# Patient Record
Sex: FEMALE | Race: WHITE | Employment: FULL TIME | URBAN - METROPOLITAN AREA
[De-identification: names, ages, dates, MRNs, and addresses within clinical notes are randomized per-mention and may not be internally consistent; named-entity substitution may affect disease eponyms.]

---
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
## 2020-01-08 ENCOUNTER — HOSPITAL ENCOUNTER (OUTPATIENT)
Age: 20
Discharge: HOME | DRG: 560 | End: 2020-01-08
Attending: OBSTETRICS & GYNECOLOGY | Admitting: OBSTETRICS & GYNECOLOGY
Payer: MEDICAID

## 2020-01-08 VITALS
BODY MASS INDEX: 24.45 KG/M2 | HEART RATE: 94 BPM | SYSTOLIC BLOOD PRESSURE: 114 MMHG | RESPIRATION RATE: 16 BRPM | DIASTOLIC BLOOD PRESSURE: 57 MMHG | TEMPERATURE: 97.4 F | OXYGEN SATURATION: 95 % | WEIGHT: 138 LBS | HEIGHT: 63 IN

## 2020-01-08 PROBLEM — Z34.90 PREGNANCY: Status: ACTIVE | Noted: 2020-01-08

## 2020-01-08 PROCEDURE — 99283 EMERGENCY DEPT VISIT LOW MDM: CPT

## 2020-01-08 PROCEDURE — 59020 FETAL CONTRACT STRESS TEST: CPT

## 2020-01-08 RX ORDER — LANOLIN ALCOHOL/MO/W.PET/CERES
CREAM (GRAM) TOPICAL
COMMUNITY
End: 2022-03-22 | Stop reason: ALTCHOICE

## 2020-01-09 ENCOUNTER — HOSPITAL ENCOUNTER (INPATIENT)
Age: 20
LOS: 3 days | Discharge: HOME | DRG: 560 | End: 2020-01-12
Attending: OBSTETRICS & GYNECOLOGY | Admitting: OBSTETRICS & GYNECOLOGY
Payer: MEDICAID

## 2020-01-09 LAB
BASOPHILS # BLD: 0.1 K/UL (ref 0–0.1)
BASOPHILS NFR BLD: 0 % (ref 0–1)
DIFFERENTIAL METHOD BLD: ABNORMAL
EOSINOPHIL # BLD: 0.1 K/UL (ref 0–0.4)
EOSINOPHIL NFR BLD: 1 % (ref 0–7)
ERYTHROCYTE [DISTWIDTH] IN BLOOD BY AUTOMATED COUNT: 15.9 % (ref 11.5–14.5)
HCT VFR BLD AUTO: 37.7 % (ref 35–47)
HGB BLD-MCNC: 12.1 G/DL (ref 11.5–16)
IMM GRANULOCYTES # BLD AUTO: 0.2 K/UL (ref 0–0.04)
IMM GRANULOCYTES NFR BLD AUTO: 2 % (ref 0–0.5)
LYMPHOCYTES # BLD: 1.6 K/UL (ref 0.8–3.5)
LYMPHOCYTES NFR BLD: 11 % (ref 12–49)
MCH RBC QN AUTO: 25.9 PG (ref 26–34)
MCHC RBC AUTO-ENTMCNC: 32.1 G/DL (ref 30–36.5)
MCV RBC AUTO: 80.6 FL (ref 80–99)
MONOCYTES # BLD: 0.8 K/UL (ref 0–1)
MONOCYTES NFR BLD: 5 % (ref 5–13)
NEUTS SEG # BLD: 11.8 K/UL (ref 1.8–8)
NEUTS SEG NFR BLD: 81 % (ref 32–75)
NRBC # BLD: 0 K/UL (ref 0–0.01)
NRBC BLD-RTO: 0 PER 100 WBC
PLATELET # BLD AUTO: 317 K/UL (ref 150–400)
PMV BLD AUTO: 11.7 FL (ref 8.9–12.9)
RBC # BLD AUTO: 4.68 M/UL (ref 3.8–5.2)
WBC # BLD AUTO: 14.5 K/UL (ref 3.6–11)

## 2020-01-09 PROCEDURE — 85025 COMPLETE CBC W/AUTO DIFF WBC: CPT

## 2020-01-09 PROCEDURE — 65410000002 HC RM PRIVATE OB

## 2020-01-09 PROCEDURE — 75410000002 HC LABOR FEE PER 1 HR

## 2020-01-09 PROCEDURE — 36415 COLL VENOUS BLD VENIPUNCTURE: CPT

## 2020-01-09 PROCEDURE — 74011250637 HC RX REV CODE- 250/637: Performed by: OBSTETRICS & GYNECOLOGY

## 2020-01-09 RX ORDER — SODIUM CHLORIDE 0.9 % (FLUSH) 0.9 %
5-40 SYRINGE (ML) INJECTION EVERY 8 HOURS
Status: DISCONTINUED | OUTPATIENT
Start: 2020-01-09 | End: 2020-01-12 | Stop reason: HOSPADM

## 2020-01-09 RX ORDER — OXYTOCIN/0.9 % SODIUM CHLORIDE 30/500 ML
0-25 PLASTIC BAG, INJECTION (ML) INTRAVENOUS
Status: DISCONTINUED | OUTPATIENT
Start: 2020-01-10 | End: 2020-01-12 | Stop reason: HOSPADM

## 2020-01-09 RX ORDER — SODIUM CHLORIDE, SODIUM LACTATE, POTASSIUM CHLORIDE, CALCIUM CHLORIDE 600; 310; 30; 20 MG/100ML; MG/100ML; MG/100ML; MG/100ML
125 INJECTION, SOLUTION INTRAVENOUS CONTINUOUS
Status: DISCONTINUED | OUTPATIENT
Start: 2020-01-09 | End: 2020-01-12 | Stop reason: HOSPADM

## 2020-01-09 RX ORDER — NALOXONE HYDROCHLORIDE 0.4 MG/ML
0.4 INJECTION, SOLUTION INTRAMUSCULAR; INTRAVENOUS; SUBCUTANEOUS AS NEEDED
Status: DISCONTINUED | OUTPATIENT
Start: 2020-01-09 | End: 2020-01-12 | Stop reason: HOSPADM

## 2020-01-09 RX ORDER — SODIUM CHLORIDE 0.9 % (FLUSH) 0.9 %
5-40 SYRINGE (ML) INJECTION AS NEEDED
Status: DISCONTINUED | OUTPATIENT
Start: 2020-01-09 | End: 2020-01-12 | Stop reason: HOSPADM

## 2020-01-09 RX ORDER — MISOPROSTOL 100 UG/1
25 TABLET ORAL EVERY 4 HOURS
Status: DISCONTINUED | OUTPATIENT
Start: 2020-01-09 | End: 2020-01-12 | Stop reason: HOSPADM

## 2020-01-09 NOTE — H&P
History & Physical    Name: Charis Estrada MRN: [id number]  SSN: [id number]    YOB: 2000  Age: 23 y.o. Sex: female      Subjective:     Estimated Date of Delivery: 20  OB History    Para Term  AB Living   1             SAB TAB Ectopic Molar Multiple Live Births                    # Outcome Date GA Lbr Lorenzo/2nd Weight Sex Delivery Anes PTL Lv   1 Current                Ms. Rebeka Pedersen is a 23 y.o.  at 38w6d, here for cervical ripening for planned eIOL tomorrow at 39w0d. +FM. No CTX, VB, LOF. GBS negative    Pregnancy c/b:  -mild anemia.   -2 admissions for tPTL at [facility] and [facility] at 30 weeks and 31 weeks, received BMZ x 2 and NP magnesium. Discharged with Procardia, not taking. Past Medical History:   Diagnosis Date    Anemia      No past surgical history on file. Social History     Occupational History    Not on file   Tobacco Use    Smoking status: Never Smoker    Smokeless tobacco: Never Used   Substance and Sexual Activity    Alcohol use: No    Drug use: No    Sexual activity: Not on file     No family history on file. No Known Allergies  Prior to Admission medications    Medication Sig Start Date End Date Taking? Authorizing Provider   [id number] you-folic-dha (PRENATAL DHA+COMPLETE PRENATAL) [id number] mg-mcg-mg cmpk Take  by mouth. Indications: pregnancy    Provider, Historical   ferrous sulfate (IRON) 325 mg (65 mg iron) tablet Take  by mouth Daily (before breakfast). Indications: anemia from inadequate iron    Provider, Historical        Review of Systems: A comprehensive review of systems was negative except for that written in the History of Present Illness. Objective:     Vitals: There were no vitals filed for this visit. Physical Exam:  Patient without distress.   Heart: Regular rate and rhythm  Lung: normal respiratory effort  Abdomen: soft, nontender  Fundus: soft and non tender  Perineum: blood absent, amniotic fluid absent  Cervical Exam: 1-270/-2  Lower Extremities: no edema  Membranes:  Intact  Fetal Heart Rate: Reactive          Prenatal Labs:   Lab Results   Component Value Date/Time    Rubella, External Immune 2.42 2019    HBsAg, External Negative 2019    HIV, External Non Reactive 2019    Gonorrhea, External Negative 2019    Chlamydia, External Negative 2019    ABO,Rh O Positive 2019    GrBStrep, External Negative 2019       Impression/Plan:     23 y.o.  at 38w6d, here for cervical ripening for tomorrow's scheduled eIOL. Plan:   -Admit for induction of labor. Will start misoprostol 25 mcg q 4 hours at midnight. Plan pitocin in am.  -Monitoring per misoprostol protocol, then continuous monitoring with pitocin.  -Group B Strep negative.

## 2020-01-09 NOTE — PROGRESS NOTES
2300- here from home c/o vaginal bleeding that is dark red. Patient states positive fetal movement, denies contractions, leaking fluids, headache or blurred vision. Patient placed on monitor. 2336-SVE by Dede Segundo, cervix unchanged from office. Orders received to discharge home. Patient and family agree with plan. 2337-Patient off monitor,  BPM.     2345-Discharge instructions reviewed and signed by patient, patient given copy of instructions. 2347-Patient ambulatory off unit with family with no signs of distress or labor.

## 2020-01-09 NOTE — DISCHARGE INSTRUCTIONS
Patient Education        Week 45 of Your Pregnancy: Care Instructions  Your Care Instructions    Believe it or not, your baby is almost here. You may have ideas about your baby's personality because of how much he or she moves. Or you may have noticed how he or she responds to sounds, warmth, cold, and light. You may even know what kind of music your baby likes. By now, you have a better idea of what to expect during delivery. You may have talked about your birth preferences with your doctor. But even if you want a vaginal birth, it is a good idea to learn about  births.  birth means that your baby is born through a cut (incision) in your lower belly. It is sometimes the best choice for the health of the baby and the mother. This care sheet can help you understand  births. It also gives you information about what to expect after your baby is born. And it helps you understand more about postpartum depression. Follow-up care is a key part of your treatment and safety. Be sure to make and go to all appointments, and call your doctor if you are having problems. It's also a good idea to know your test results and keep a list of the medicines you take. How can you care for yourself at home? Learn about  birth  · Most C-sections are unplanned. They are done because of problems that occur during labor. These problems might include:  ? Labor that slows or stops. ? High blood pressure or other problems for the mother. ? Signs of distress in the baby. These signs may include a very fast or slow heart rate. · Although most mothers and babies do well after , it is major surgery. It has more risks than a vaginal delivery. · In some cases, a planned  may be safer than a vaginal delivery. This may be the case if:  ? The mother has a health problem, such as a heart condition. ? The baby isn't in a head-down position for delivery. This is called a breech position. ?  The uterus has scars from past surgeries. This could increase the chance of a tear in the uterus. ? There is a problem with the placenta. ? The mother has an infection, such as genital herpes, that could be spread to the baby. ? The mother is having twins or more. ? The baby weighs 9 to 10 pounds or more. · Because of the risks of , planned C-sections generally should be done only for medical reasons. And a planned  should be done at 39 weeks or later unless there is a medical reason to do it sooner. Know what to expect after delivery, and plan for the first few weeks at home  · You, your baby, and your partner or  will get identification bands. Only people with matching bands can  the baby from the nursery. · You will learn how to feed, diaper, and bathe your baby. And you will learn how to care for the umbilical cord stump. If your baby will be circumcised, you will also learn how to care for that. · Ask people to wait to visit you until you are at home. And ask them to wash their hands before they touch your baby. · Make sure you have another adult in your home for at least 2 or 3 days after the birth. · During the first 2 weeks, limit when friends and family can visit. · Do not allow visitors who have colds or infections. Make sure all visitors are up to date with their vaccinations. Never let anyone smoke around your baby. · Try to nap when the baby naps. Be aware of postpartum depression  · \"Baby blues\" are common for the first 1 to 2 weeks after birth. You may cry or feel sad or irritable for no reason. · For some women, these feelings last longer and are more intense. This is called postpartum depression. · If your symptoms last for more than a few weeks or you feel very depressed, ask your doctor for help. · Postpartum depression can be treated. Support groups and counseling can help. Sometimes medicine can also help. Where can you learn more?   Go to http://jodee-juan.info/. Enter B044 in the search box to learn more about \"Week 38 of Your Pregnancy: Care Instructions. \"  Current as of: May 29, 2019  Content Version: 12.2  © 8151-9427 [employer]. Care instructions adapted under license by [employer] (which disclaims liability or warranty for this information). If you have questions about a medical condition or this instruction, always ask your healthcare professional. [address] any warranty or liability for your use of this information.

## 2020-01-09 NOTE — H&P
OB History & Physical    Name: Elver Samayoa MRN: [id number]  SSN: [id number]    YOB: 2000  Age: 23 y.o. Sex: female      Subjective:     Reason for Admission:  Pregnancy and vaginal bleeding    History of Present Illness: Elver Samyaoa is a 23 y.o.  female with an estimated gestational age of 38w7d with Estimated Date of Delivery: 20. Patient complains of have a cervix check earlier today in the office, then having some vaginal bleeding this evening. She denies ROM, contractions. The baby has been active. OB History        1    Para        Term                AB        Living           SAB        TAB        Ectopic        Molar        Multiple        Live Births                  Past Medical History:   Diagnosis Date    Anemia      History reviewed. No pertinent surgical history. Social History     Occupational History    Not on file   Tobacco Use    Smoking status: Never Smoker    Smokeless tobacco: Never Used   Substance and Sexual Activity    Alcohol use: No    Drug use: No    Sexual activity: Not on file     History reviewed. No pertinent family history. No Known Allergies  Prior to Admission medications    Medication Sig Start Date End Date Taking? Authorizing Provider   [id number] you-folic-dha (PRENATAL DHA+COMPLETE PRENATAL) [id number] mg-mcg-mg cmpk Take  by mouth. Indications: pregnancy   Yes Provider, Historical   ferrous sulfate (IRON) 325 mg (65 mg iron) tablet Take  by mouth Daily (before breakfast).  Indications: anemia from inadequate iron   Yes Provider, Historical        Review of Systems:  General: Denies fever, sweats, chills  CV: Denies CP, palpitations  Resp: Denies SOB, cough  GI: Denies nausea, vomiting, diarrhea  : Denies dysura, abnormal frequency, hematuria  Neuro: Denies HA, visual disturbance  All other systems reviewed and are negative    Objective:     Vitals:    Vitals:    20 2323 20 2328 20 2333 20 2334   BP:    114/57   Pulse:    94   Resp:       Temp:       SpO2: 97% 96% 95%    Weight:       Height:          Temp (24hrs), Av.4 °F (36.3 °C), Min:97.4 °F (36.3 °C), Max:97.4 °F (36.3 °C)    BP  Min: 114/57  Max: 129/64     Physical Exam  General: in NAD  HEENT: normocephalic  Back: no CVAT  Abdomen: Gravid, soft, nontender, no abnormal masses, rebound, rigidity, guarding  Fundus: soft, nontender  Extremities: no abnormal cyanosis, clubbing, edema  Skin: warm, dry, no abnormal lesions noted  Neurological: DTR's 1-2+ no clonus  Pelvic:Cervical Exam: 2/70/-2; no blood  Uterine Activity: occasional  Membranes: no gross evidence of ROM  Fetal Heart Rate: adequate variability and reactivity; no significant abnormal decelerations    Patient Active Problem List   Diagnosis Code    Pregnancy Z34.90     Assessment and Plan:     38 week IUP, false labor  Discharge home  Follow-up as scheduled in the office  Precautions discussed/questions answered    Signed By:  Zoë Mcdermott MD     2020

## 2020-01-10 ENCOUNTER — ANESTHESIA (OUTPATIENT)
Dept: LABOR AND DELIVERY | Age: 20
DRG: 560 | End: 2020-01-10
Payer: MEDICAID

## 2020-01-10 ENCOUNTER — ANESTHESIA EVENT (OUTPATIENT)
Dept: LABOR AND DELIVERY | Age: 20
DRG: 560 | End: 2020-01-10
Payer: MEDICAID

## 2020-01-10 PROCEDURE — 74011250636 HC RX REV CODE- 250/636: Performed by: OBSTETRICS & GYNECOLOGY

## 2020-01-10 PROCEDURE — 74011000250 HC RX REV CODE- 250

## 2020-01-10 PROCEDURE — 76060000078 HC EPIDURAL ANESTHESIA

## 2020-01-10 PROCEDURE — 3E0P7VZ INTRODUCTION OF HORMONE INTO FEMALE REPRODUCTIVE, VIA NATURAL OR ARTIFICIAL OPENING: ICD-10-PCS | Performed by: OBSTETRICS & GYNECOLOGY

## 2020-01-10 PROCEDURE — 75410000000 HC DELIVERY VAGINAL/SINGLE

## 2020-01-10 PROCEDURE — 74011250637 HC RX REV CODE- 250/637: Performed by: OBSTETRICS & GYNECOLOGY

## 2020-01-10 PROCEDURE — 65410000002 HC RM PRIVATE OB

## 2020-01-10 PROCEDURE — 74011250636 HC RX REV CODE- 250/636: Performed by: ANESTHESIOLOGY

## 2020-01-10 PROCEDURE — 74011000250 HC RX REV CODE- 250: Performed by: ANESTHESIOLOGY

## 2020-01-10 PROCEDURE — 75410000002 HC LABOR FEE PER 1 HR

## 2020-01-10 PROCEDURE — 3E033VJ INTRODUCTION OF OTHER HORMONE INTO PERIPHERAL VEIN, PERCUTANEOUS APPROACH: ICD-10-PCS | Performed by: OBSTETRICS & GYNECOLOGY

## 2020-01-10 PROCEDURE — 00HU33Z INSERTION OF INFUSION DEVICE INTO SPINAL CANAL, PERCUTANEOUS APPROACH: ICD-10-PCS | Performed by: ANESTHESIOLOGY

## 2020-01-10 PROCEDURE — 75410000003 HC RECOV DEL/VAG/CSECN EA 0.5 HR

## 2020-01-10 PROCEDURE — 77010026065 HC OXYGEN MINIMUM MEDICAL AIR

## 2020-01-10 PROCEDURE — 77030014125 HC TY EPDRL BBMI -B: Performed by: ANESTHESIOLOGY

## 2020-01-10 PROCEDURE — 0KQM0ZZ REPAIR PERINEUM MUSCLE, OPEN APPROACH: ICD-10-PCS | Performed by: OBSTETRICS & GYNECOLOGY

## 2020-01-10 RX ORDER — NALOXONE HYDROCHLORIDE 0.4 MG/ML
0.4 INJECTION, SOLUTION INTRAMUSCULAR; INTRAVENOUS; SUBCUTANEOUS AS NEEDED
Status: DISCONTINUED | OUTPATIENT
Start: 2020-01-10 | End: 2020-01-12 | Stop reason: HOSPADM

## 2020-01-10 RX ORDER — ZOLPIDEM TARTRATE 5 MG/1
5 TABLET ORAL
Status: DISCONTINUED | OUTPATIENT
Start: 2020-01-10 | End: 2020-01-12 | Stop reason: HOSPADM

## 2020-01-10 RX ORDER — OXYCODONE AND ACETAMINOPHEN 5; 325 MG/1; MG/1
1 TABLET ORAL
Status: DISCONTINUED | OUTPATIENT
Start: 2020-01-10 | End: 2020-01-12 | Stop reason: HOSPADM

## 2020-01-10 RX ORDER — ONDANSETRON 2 MG/ML
4 INJECTION INTRAMUSCULAR; INTRAVENOUS
Status: DISCONTINUED | OUTPATIENT
Start: 2020-01-10 | End: 2020-01-12 | Stop reason: HOSPADM

## 2020-01-10 RX ORDER — SODIUM CHLORIDE 0.9 % (FLUSH) 0.9 %
5-40 SYRINGE (ML) INJECTION AS NEEDED
Status: DISCONTINUED | OUTPATIENT
Start: 2020-01-10 | End: 2020-01-12 | Stop reason: HOSPADM

## 2020-01-10 RX ORDER — BUPIVACAINE HYDROCHLORIDE 2.5 MG/ML
INJECTION, SOLUTION EPIDURAL; INFILTRATION; INTRACAUDAL AS NEEDED
Status: DISCONTINUED | OUTPATIENT
Start: 2020-01-10 | End: 2020-01-10 | Stop reason: HOSPADM

## 2020-01-10 RX ORDER — FENTANYL CITRATE 50 UG/ML
INJECTION, SOLUTION INTRAMUSCULAR; INTRAVENOUS AS NEEDED
Status: DISCONTINUED | OUTPATIENT
Start: 2020-01-10 | End: 2020-01-10 | Stop reason: HOSPADM

## 2020-01-10 RX ORDER — BUTORPHANOL TARTRATE 2 MG/ML
0.5 INJECTION INTRAMUSCULAR; INTRAVENOUS
Status: DISCONTINUED | OUTPATIENT
Start: 2020-01-10 | End: 2020-01-12 | Stop reason: HOSPADM

## 2020-01-10 RX ORDER — FENTANYL/BUPIVACAINE/NS/PF 2-1250MCG
1-16 PREFILLED PUMP RESERVOIR EPIDURAL CONTINUOUS
Status: DISCONTINUED | OUTPATIENT
Start: 2020-01-10 | End: 2020-01-12 | Stop reason: HOSPADM

## 2020-01-10 RX ORDER — IBUPROFEN 400 MG/1
800 TABLET ORAL EVERY 8 HOURS
Status: DISCONTINUED | OUTPATIENT
Start: 2020-01-10 | End: 2020-01-12 | Stop reason: HOSPADM

## 2020-01-10 RX ORDER — FENTANYL/BUPIVACAINE/NS/PF 2-1250MCG
PREFILLED PUMP RESERVOIR EPIDURAL
Status: COMPLETED
Start: 2020-01-10 | End: 2020-01-10

## 2020-01-10 RX ORDER — ACETAMINOPHEN 325 MG/1
650 TABLET ORAL
Status: DISCONTINUED | OUTPATIENT
Start: 2020-01-10 | End: 2020-01-12 | Stop reason: HOSPADM

## 2020-01-10 RX ORDER — FENTANYL CITRATE 50 UG/ML
INJECTION, SOLUTION INTRAMUSCULAR; INTRAVENOUS
Status: COMPLETED
Start: 2020-01-10 | End: 2020-01-10

## 2020-01-10 RX ORDER — BUPIVACAINE HYDROCHLORIDE 2.5 MG/ML
INJECTION, SOLUTION EPIDURAL; INFILTRATION; INTRACAUDAL
Status: COMPLETED
Start: 2020-01-10 | End: 2020-01-10

## 2020-01-10 RX ORDER — HYDROCORTISONE ACETATE PRAMOXINE HCL 2.5; 1 G/100G; G/100G
CREAM TOPICAL AS NEEDED
Status: DISCONTINUED | OUTPATIENT
Start: 2020-01-10 | End: 2020-01-12 | Stop reason: HOSPADM

## 2020-01-10 RX ORDER — SODIUM CHLORIDE 0.9 % (FLUSH) 0.9 %
5-40 SYRINGE (ML) INJECTION EVERY 8 HOURS
Status: DISCONTINUED | OUTPATIENT
Start: 2020-01-10 | End: 2020-01-12 | Stop reason: HOSPADM

## 2020-01-10 RX ADMIN — Medication 12 ML/HR: at 10:37

## 2020-01-10 RX ADMIN — Medication 10 ML: at 22:38

## 2020-01-10 RX ADMIN — Medication 1 MILLI-UNITS/MIN: at 08:05

## 2020-01-10 RX ADMIN — ONDANSETRON 4 MG: 2 INJECTION INTRAMUSCULAR; INTRAVENOUS at 07:46

## 2020-01-10 RX ADMIN — SODIUM CHLORIDE, SODIUM LACTATE, POTASSIUM CHLORIDE, AND CALCIUM CHLORIDE 300 ML: 600; 310; 30; 20 INJECTION, SOLUTION INTRAVENOUS at 06:05

## 2020-01-10 RX ADMIN — IBUPROFEN 800 MG: 400 TABLET, FILM COATED ORAL at 22:37

## 2020-01-10 RX ADMIN — SODIUM CHLORIDE, SODIUM LACTATE, POTASSIUM CHLORIDE, AND CALCIUM CHLORIDE 999 ML/HR: 600; 310; 30; 20 INJECTION, SOLUTION INTRAVENOUS at 09:46

## 2020-01-10 RX ADMIN — BUPIVACAINE HYDROCHLORIDE 3 ML: 2.5 INJECTION, SOLUTION EPIDURAL; INFILTRATION; INTRACAUDAL; PERINEURAL at 10:29

## 2020-01-10 RX ADMIN — SODIUM CHLORIDE, SODIUM LACTATE, POTASSIUM CHLORIDE, AND CALCIUM CHLORIDE 125 ML/HR: 600; 310; 30; 20 INJECTION, SOLUTION INTRAVENOUS at 08:05

## 2020-01-10 RX ADMIN — FENTANYL CITRATE 100 MCG: 50 INJECTION, SOLUTION INTRAMUSCULAR; INTRAVENOUS at 10:30

## 2020-01-10 RX ADMIN — BUPIVACAINE HYDROCHLORIDE 3 ML: 2.5 INJECTION, SOLUTION EPIDURAL; INFILTRATION; INTRACAUDAL; PERINEURAL at 10:30

## 2020-01-10 RX ADMIN — BUPIVACAINE HYDROCHLORIDE 0.8 ML: 2.5 INJECTION, SOLUTION EPIDURAL; INFILTRATION; INTRACAUDAL; PERINEURAL at 10:28

## 2020-01-10 RX ADMIN — IBUPROFEN 800 MG: 400 TABLET, FILM COATED ORAL at 15:28

## 2020-01-10 RX ADMIN — MISOPROSTOL 25 MCG: 100 TABLET ORAL at 04:00

## 2020-01-10 RX ADMIN — BUTORPHANOL TARTRATE 0.5 MG: 2 INJECTION, SOLUTION INTRAMUSCULAR; INTRAVENOUS at 07:46

## 2020-01-10 RX ADMIN — MISOPROSTOL 25 MCG: 100 TABLET ORAL at 00:00

## 2020-01-10 NOTE — PROGRESS NOTES
S/p misoprostol x2 overnight  Tolerating ctx    Visit Vitals  /79   Pulse 68   Temp 98.4 °F (36.9 °C)   Resp 18   Ht 5' 3\" (1.6 m)   Wt 62.6 kg (138 lb)   SpO2 100%   Breastfeeding No   BMI 24.45 kg/m²     Cvx: 1. 5/80/-1  AROM - clear  [place]: Q2 min  FHR: 120, category 1    Initiate pitocin @ 0800, titrate per protocol  Stadol and/or epidural prn

## 2020-01-10 NOTE — L&D DELIVERY NOTE
Delivery Summary  Patient: Colette Taylor             Circumcision:   desires  Additional Delivery Comments - G1 presented at 39 weeks for elective induction. Given buccal misoprotosol x 2 and then Pitocin and AROM performed. She progressed quickly and pushed for an hour to deliver over intact perineum. Shoulders and body delivered easily and baby placed on maternal abdomen. After 2 minute delay cord was double clamped and cut. Placenta delivered intact with 3VC 2 minutes later. Pitocin was added to IVFs and fundus was firm. A second degree laceration was repaired with 3-0 Vicryl in standard fashion. There was minimal bleeding and mom and baby were doing well. Information for the patient's :  Liv Sotelo [[id number]]       Labor Events:    Labor: No    Steroids: None   Cervical Ripening Date/Time: 1/10/2020 12:00 AM   Cervical Ripening Type: Misoprostol   Antibiotics During Labor: No   Rupture Identifier: Sac 1    Rupture Date/Time: 1/10/2020 7:28 AM   Rupture Type: AROM   Amniotic Fluid Volume: Moderate    Amniotic Fluid Description: Clear    Amniotic Fluid Odor: None    Induction: AROM; Oxytocin       Induction Date/Time: 1/10/2020 12:00 AM    Indications for Induction: Elective    Augmentation: None   Augmentation Date/Time:      Indications for Augmentation:     Labor complications:          Additional complications:        Delivery Events:  Indications For Episiotomy:     Episiotomy: None   Perineal Laceration(s): 2nd   Repaired: Yes   Periurethral Laceration Location:      Repaired:     Labial Laceration Location:     Repaired:     Sulcal Laceration Location:     Repaired:     Vaginal Laceration Location:     Repaired:     Cervical Laceration Location:     Repaired:     Repair Suture: Vicryl 3-0   Number of Repair Packets: 1   Estimated Blood Loss (ml): 250ml     Delivery Date: 1/10/2020    Delivery Time: 1:16 PM  Delivery Type: Vaginal, Spontaneous  Sex:  Male Gestational Age: 39w0d   Delivery Clinician:  Gagandeep Jimenez  Living Status:     Delivery Location:              [facility]  One minute Five minutes Ten minutes   Skin color:            Heart rate:            Grimace:            Muscle tone:            Breathing: Totals:                Presentation:      Position:        Resuscitation Method:        Meconium Stained:        Cord Information: 3 Vessels  Complications: None  Cord around:    Delayed cord clamping? Cord clamped date/time:   Disposition of Cord Blood:      Blood Gases Sent?:      Placenta:  Date/Time: 1/10/2020  1:19 PM  Removal: Spontaneous      Appearance:        Measurements:  Birth Weight:        Birth Length:        Head Circumference:        Chest Circumference:       Abdominal Girth: Other Providers:    , Obstetrician;Primary Nurse;Primary [place] Nurse;Nicu Nurse;Neonatologist;Anesthesiologist;Crna;Nurse Practitioner;Midwife;Nursery Nurse           Cord pH:  none    Episiotomy: None   Laceration(s): 2nd     Estimated Blood Loss (ml): 250    Labor Events  Method: AROM; Oxytocin      Augmentation: None   Cervical Ripenin/10/2020  12:00 AM  Misoprostol        Hospital Problems  Date Reviewed: 1/10/2020    None          Operative Vaginal Delivery - none    Group B Strep:   Lab Results   Component Value Date/Time    Jose, External Negative 2019     Information for the patient's :  Maikel Sheppard [[id number]]   No results found for: ABORH, PCTABR, PCTDIG, BILI, ABORHEXT, ABORH    No results found for: APH, APCO2, APO2, AHCO3, ABEC, ABDC, O2ST, EPHV, PCO2V, PO2V, HCO3V, EBEV, EBDV, SITE, RSCOM

## 2020-01-10 NOTE — PROGRESS NOTES
2000: Pt arrived for scheduled indx. G1 38w6d, admitted to room 3163. Reports positive fetal movement. Denies LOF or bleeding. EFM applied, IV inserted, labs sent, and consents reviewed and signed. 2015: Dr. Vidal Filter at bedside for SVE. 1/70/-2. Orders received to begin indx with cytotec at midnight. 2335: EFM applied. 0559: Prolonged decel noted. Pt turned to left side, O2 applied, 300 cc bolus given. FHR returned to baseline after 2.5 minutes. 4867: Pt rang out stating her water might have broke. Upon assessment there was no fluid noted. Will continue to monitor. Pt up to bathroom. 8760: While walking back to bed from bathroom, pt stated she leaked on the floor. Upon assessment there were three pink tinged droplets of thin fluid on floor. Once back in bed, pt was instructed to cough to assess leakage of fluid. No fluid noted. Will continue to monitor. 5907: Dr. Abelardo Galvez at bedside to perform SVE. 1.5/80/-2. AROM clear, moderate fluid. 5476: Pt states she wants to start bolus for epidural. Bolus started at this time.

## 2020-01-10 NOTE — PROGRESS NOTES
Assuming care of this 24 y/o  @ 38+6 weeks admitted to initiate elective (logistic) cervical ripening/IOL at [facility] (39+0 weeks). No complaints.      Prenatal course complicated by:  - anemia, iron supplementation  - admitted twice for tPTL (30 & 31wks), s/p BMZ x2 & NP magnesium    Visit Vitals  /76 (BP 1 Location: Left arm, BP Patient Position: At rest)   Pulse (!) 102   Temp 98.6 °F (37 °C)   Resp 18   Ht 5' 3\" (1.6 m)   Wt 62.6 kg (138 lb)   SpO2 99%   Breastfeeding No   BMI 24.45 kg/m²     Cvx: deferred  [place]: irregular ctx  FHR: 130s, category 1    Proceed with misoprostol cervical ripening at [facility] as planned

## 2020-01-10 NOTE — ANESTHESIA PREPROCEDURE EVALUATION
Relevant Problems   No relevant active problems       Anesthetic History   No history of anesthetic complications            Review of Systems / Medical History  Patient summary reviewed, nursing notes reviewed and pertinent labs reviewed    Pulmonary  Within defined limits                 Neuro/Psych   Within defined limits           Cardiovascular  Within defined limits                Exercise tolerance: >4 METS     GI/Hepatic/Renal  Within defined limits              Endo/Other  Within defined limits           Other Findings   Comments: IUP - Labor           Physical Exam    Airway  Mallampati: II  TM Distance: > 6 cm  Neck ROM: normal range of motion   Mouth opening: Normal     Cardiovascular  Regular rate and rhythm,  S1 and S2 normal,  no murmur, click, rub, or gallop             Dental  No notable dental hx       Pulmonary  Breath sounds clear to auscultation               Abdominal  GI exam deferred       Other Findings            Anesthetic Plan    ASA: 1  Anesthesia type: CSE            Anesthetic plan and risks discussed with: Patient

## 2020-01-11 PROCEDURE — 74011250637 HC RX REV CODE- 250/637: Performed by: OBSTETRICS & GYNECOLOGY

## 2020-01-11 PROCEDURE — 65410000002 HC RM PRIVATE OB

## 2020-01-11 RX ADMIN — IBUPROFEN 800 MG: 400 TABLET, FILM COATED ORAL at 15:19

## 2020-01-11 RX ADMIN — ACETAMINOPHEN 650 MG: 325 TABLET ORAL at 20:17

## 2020-01-11 RX ADMIN — IBUPROFEN 800 MG: 400 TABLET, FILM COATED ORAL at 07:32

## 2020-01-11 RX ADMIN — IBUPROFEN 800 MG: 400 TABLET, FILM COATED ORAL at 23:34

## 2020-01-11 NOTE — PROGRESS NOTES
Bedside shift change report given to HECTOR Bishop RN (oncoming nurse) by [employer] (offgoing nurse). Report included the following information SBAR, Intake/Output, MAR and Recent Results.

## 2020-01-11 NOTE — PROGRESS NOTES
SBAR report received from Srinivasa Christensen RN. Assumed care of patient. Patient placed in position of comfort. Call bell in reach. 2344: Pt in bed resting quietly at this time watching TV; call bell within reach. 0145: Pt in bed resting quietly at this time; pt in a position of comfort; call bell within reach. 2535: Off going SBAR report given to Mi Joe RN.

## 2020-01-11 NOTE — PROGRESS NOTES
Post-Partum Day Number 1 Progress Note    Ford Carreno     Assessment: Doing well, post partum day 1    Plan:  1. Continue routine postpartum and perineal care as well as maternal education. 2. The risks and benefits of the circumcision  procedure and anesthesia including: bleeding, infection, variability of cosmetic results were discussed at length with the mother. She is aware that future repeat procedures may be necessary. She gives informed consent to proceed as noted and her questions are answered. Information for the patient's :  Angie Knight [[id number]]   Vaginal, Spontaneous   Patient doing well without significant complaint. Voiding without difficulty, normal lochia. Vitals:  Visit Vitals  /57 (BP 1 Location: Left arm, BP Patient Position: At rest)   Pulse 64   Temp 97.7 °F (36.5 °C)   Resp 17   Ht 5' 3\" (1.6 m)   Wt 62.6 kg (138 lb)   SpO2 100%   Breastfeeding Unknown   BMI 24.45 kg/m²     Temp (24hrs), Av °F (36.7 °C), Min:97.6 °F (36.4 °C), Max:98.4 °F (36.9 °C)        Exam:   Patient without distress. Abdomen soft, fundus firm, nontender                Perineum with normal lochia noted. Lower extremities are negative for swelling, cords or tenderness. Labs:     Lab Results   Component Value Date/Time    WBC 14.5 (H) 2020 08:49 PM    HGB 12.1 2020 08:49 PM    HCT 37.7 2020 08:49 PM    PLATELET 376  08:49 PM       No results found for this or any previous visit (from the past 24 hour(s)).

## 2020-01-11 NOTE — LACTATION NOTE
This note was copied from a baby's chart. P1  Pt will successfully establish breastfeeding by feeding in response to early feeding cues or wake every 3h, will obtain deep latch, and will keep log of feedings/output. Taught to BF at hunger cues and or q 2-3 hrs and to offer 10-20 drops of hand expressed colostrum at any non-feeds. @ time of assessment, baby led feedings at 22 hours total x 6  4 wet and 2 stools. Reviewed breastfeeding basics:  How milk is made and normal  breastfeeding behaviors discussed. Supply and demand,  stomach size, early feeding cues, skin to skin bonding, positioning and baby led latch-on, assymetrical latch with signs of good, deep latch vs shallow, feeding frequency and duration, and log sheet for tracking infant feedings and output. Breastfeeding Booklet and Warm line information given. Discussed typical  weight loss and the importance of infant weight checks with pediatrician 1 day post discharge. Breast Assessment  Left Breast: Small   Left Nipple: Everted, Intact  Right Breast: Small   Right Nipple: Everted, Intact  Breast- Feeding Assessment  Attends Breast-Feeding Classes: No  Breast-Feeding Experience: No  Breast Trauma/Surgery: No  Type/Quality: Good  Lactation Consultant Visits  Breast-Feedings: Good   No latch score available at this time. [phone] phone # provided. Call for assessment prn. Denies questions or concerns.

## 2020-01-12 VITALS
HEIGHT: 63 IN | HEART RATE: 68 BPM | RESPIRATION RATE: 18 BRPM | OXYGEN SATURATION: 95 % | TEMPERATURE: 97.8 F | DIASTOLIC BLOOD PRESSURE: 60 MMHG | WEIGHT: 138 LBS | SYSTOLIC BLOOD PRESSURE: 125 MMHG | BODY MASS INDEX: 24.45 KG/M2

## 2020-01-12 PROBLEM — Z34.90 PREGNANCY: Status: RESOLVED | Noted: 2020-01-08 | Resolved: 2020-01-12

## 2020-01-12 PROCEDURE — 74011250637 HC RX REV CODE- 250/637: Performed by: OBSTETRICS & GYNECOLOGY

## 2020-01-12 RX ORDER — IBUPROFEN 600 MG/1
800 TABLET ORAL
Qty: 30 TAB | Refills: 0 | Status: SHIPPED | OUTPATIENT
Start: 2020-01-12 | End: 2021-01-06

## 2020-01-12 RX ADMIN — IBUPROFEN 800 MG: 400 TABLET, FILM COATED ORAL at 07:36

## 2020-01-12 NOTE — LACTATION NOTE
This note was copied from a baby's chart.     Couplet Interdisciplinary Rounds     MATERNAL    Daily Goal:     Influenza screening completed: YES and Patient refused   Tdap screening completed: YES   Rhogam Given:N/A  MMR Given:N/A    VTE Prophylaxis: Not indicated, per Provider order    EPDS:            Patient Name: Romayne Cedars Englehart Diagnosis: Single liveborn, born in hospital, delivered [Z38.00]   Date of Admission: 1/10/2020 LOS: 2  Gestational Age: Gestational Age: 39w0d       Daily Goal:     Birth Weight: 2.892 kg Current Weight: Weight: 2.845 kg  % of Weight Change: -2%    Feeding:   Metabolic Screen: YES and Initial    Hepatitis B:  YES and On MAR    Discharge Bili:  YES  Car Seat Trial, if needed:  N/A      Patient/Family Teaching Needs:     Days before discharge: Ready for discharge    In Attendance:  Nursing and Physician

## 2020-01-12 NOTE — DISCHARGE INSTRUCTIONS
POST DELIVERY DISCHARGE INSTRUCTIONS    Name: Lewis Restrepo  YOB: 2000  Primary Diagnosis: Active Problems:    * No active hospital problems. *      General:     Diet/Diet Restrictions:  · Eight 8-ounce glasses of fluid daily (water, juices); avoid excessive caffeine intake. · Meals/snacks as desired which are high in fiber and carbohydrates and low in fat and cholesterol. Medications:         Physical Activity / Restrictions / Safety:     · Avoid heavy lifting, no more that 8 lbs. For 2-3 weeks;   · Limit use of stairs to 2 times daily for the first week home. · No driving for one week. · Avoid intercourse 4-6 weeks, no douching or tampon use. · Check with obstetrician before starting or resuming an exercise program.      Discharge Instructions/Special Treatment/Home Care Needs:     · Continue prenatal vitamins. · Continue to use squirt bottle with warm water on your episiotomy after each bathroom use until bleeding stops. · If steri-strips applied to your incision, remove in 7-10 days. Call your doctor for the following:     · Fever over 101 degrees by mouth. · Vaginal bleeding heavier than a normal menstrual period or clots larger than a golf ball. · Red streaks or increased swelling of legs, painful red streaks on your breast.  · Painful urination, constipation and increased pain or swelling or discharge with your incision. · If you feel extremely anxious or overwhelmed. · If you have thoughts of harming yourself and/or your baby. Pain Management:     · Take Acetaminophen (Tylenol) or Ibuprofen (Advil, Motrin), as directed for pain. · Use a warm Sitz bath 3 times daily to relieve episiotomy or hemorrhoidal discomfort. · For hemorrhoidal discomfort, use Tucks and Anusol cream as needed and directed. · Heating pad to  incision as needed.      Follow-Up Care:     Appointment with MD:   Follow-up Appointments   Procedures    FOLLOW UP VISIT Appointment in: 6 Weeks With dr Mandi Bowser for postpartum visit     With dr Mandi Bowser for postpartum visit     Standing Status:   Standing     Number of Occurrences:   1     Order Specific Question:   Appointment in     Answer:   George Thomason     Telephone number: [phone]    Signed By: Oswaldo Jay MD                                                                                                   Date: 1/12/2020 Time: 10:00 AM

## 2020-01-12 NOTE — DISCHARGE SUMMARY
Obstetrical Discharge Summary     Name: Bessy Wyman MRN: [id number]  SSN: [id number]    YOB: 2000  Age: 23 y.o. Sex: female      Admit Date: 2020    Discharge Date: 2020     Admitting Physician: Ursula Fink MD     Attending Physician:  Stella Roche MD     Admission Diagnoses: Encounter for elective induction of labor [Z34.90]    Discharge Diagnoses:   Information for the patient's :  Marci Ross [[id number]]   Delivery of a 2.892 kg male infant via Vaginal, Spontaneous on 1/10/2020 at 1:16 PM  by Ana Rosa Hernandez. Apgars were 8  and 9 . Additional Diagnoses:   Hospital Problems  Date Reviewed: 1/10/2020    None         Lab Results   Component Value Date/Time    Rubella, External Immune 2.42 2019    GrBStrep, External Negative 2019       Hospital Course: Normal hospital course following the delivery. Disposition at Discharge: Home or self care    Discharged Condition: Stable    Patient Instructions:   Current Discharge Medication List      START taking these medications    Details   ibuprofen (MOTRIN) 600 mg tablet Take 1 Tab by mouth every eight (8) hours as needed for Pain for up to 360 days. Qty: 30 Tab, Refills: 0         CONTINUE these medications which have NOT CHANGED    Details   [id number] you-folic-dha (PRENATAL DHA+COMPLETE PRENATAL) -300 mg-mcg-mg cmpk Take  by mouth. Indications: pregnancy      ferrous sulfate (IRON) 325 mg (65 mg iron) tablet Take  by mouth Daily (before breakfast). Indications: anemia from inadequate iron             Reference my discharge instructions.     Follow-up Appointments   Procedures    FOLLOW UP VISIT Appointment in: 6 Weeks With dr Sneha Estrada for postpartum visit     With dr Sneha Estrada for postpartum visit     Standing Status:   Standing     Number of Occurrences:   1     Order Specific Question:   Appointment in     Answer:   6 Weeks        Signed By:  Ana Rosa Hernandez MD     2020

## 2020-01-12 NOTE — ROUTINE PROCESS
Bedside and Verbal shift change report given to GISSELLE Desouza RN (oncoming nurse) by Keyon Pinon RN (offgoing nurse). Report included the following information SBAR.

## 2020-01-12 NOTE — PROGRESS NOTES
Post-Partum Day Number 2 Progress Note    Ford Carreno     Assessment: Doing well, post partum day 2    Plan:   1. Discharge home today  2. Follow up in office in 6 weeks with Philip Sigala MD  3. Post partum activity advised, diet as tolerated  4. Discharge Medications: ibuprofen and medications prior to admission    Information for the patient's :  Jack Horse [[id number]]   Vaginal, Spontaneous   Patient doing well without significant complaint. Voiding without difficulty, normal lochia. Vitals:  Visit Vitals  /60   Pulse 68   Temp 97.8 °F (36.6 °C)   Resp 18   Ht 5' 3\" (1.6 m)   Wt 62.6 kg (138 lb)   SpO2 95%   Breastfeeding Unknown   BMI 24.45 kg/m²     Temp (24hrs), Av.9 °F (36.6 °C), Min:97.8 °F (36.6 °C), Max:98.1 °F (36.7 °C)      Exam:         Patient without distress. Abdomen soft, fundus firm, nontender                 Lower extremities are negative for swelling, cords or tenderness. Labs:     Lab Results   Component Value Date/Time    WBC 14.5 (H) 2020 08:49 PM    HGB 12.1 2020 08:49 PM    HCT 37.7 2020 08:49 PM    PLATELET 849  08:49 PM       No results found for this or any previous visit (from the past 24 hour(s)).

## 2022-02-22 ENCOUNTER — OFFICE VISIT (OUTPATIENT)
Dept: FAMILY MEDICINE CLINIC | Age: 22
End: 2022-02-22
Payer: COMMERCIAL

## 2022-02-22 VITALS
HEART RATE: 73 BPM | BODY MASS INDEX: 22.5 KG/M2 | DIASTOLIC BLOOD PRESSURE: 64 MMHG | WEIGHT: 127 LBS | RESPIRATION RATE: 18 BRPM | OXYGEN SATURATION: 99 % | TEMPERATURE: 98.3 F | SYSTOLIC BLOOD PRESSURE: 111 MMHG | HEIGHT: 63 IN

## 2022-02-22 DIAGNOSIS — Z84.89: Primary | ICD-10-CM

## 2022-02-22 DIAGNOSIS — E01.0 THYROMEGALY: ICD-10-CM

## 2022-02-22 DIAGNOSIS — Z82.49 FAMILY HISTORY OF BRAIN ANEURYSM: ICD-10-CM

## 2022-02-22 DIAGNOSIS — G43.111 INTRACTABLE MIGRAINE WITH AURA WITH STATUS MIGRAINOSUS: ICD-10-CM

## 2022-02-22 LAB
ALBUMIN SERPL-MCNC: 3.9 G/DL (ref 3.5–5)
ALBUMIN/GLOB SERPL: 1.1 {RATIO} (ref 1.1–2.2)
ALP SERPL-CCNC: 46 U/L (ref 45–117)
ALT SERPL-CCNC: 19 U/L (ref 12–78)
ANION GAP SERPL CALC-SCNC: 3 MMOL/L (ref 5–15)
APPEARANCE UR: CLEAR
AST SERPL-CCNC: 12 U/L (ref 15–37)
BASOPHILS # BLD: 0 K/UL (ref 0–0.1)
BASOPHILS NFR BLD: 1 % (ref 0–1)
BILIRUB SERPL-MCNC: 0.6 MG/DL (ref 0.2–1)
BILIRUB UR QL: NEGATIVE
BUN SERPL-MCNC: 13 MG/DL (ref 6–20)
BUN/CREAT SERPL: 16 (ref 12–20)
CALCIUM SERPL-MCNC: 9.4 MG/DL (ref 8.5–10.1)
CHLORIDE SERPL-SCNC: 111 MMOL/L (ref 97–108)
CHOLEST SERPL-MCNC: 138 MG/DL
CO2 SERPL-SCNC: 24 MMOL/L (ref 21–32)
COLOR UR: NORMAL
CREAT SERPL-MCNC: 0.79 MG/DL (ref 0.55–1.02)
DIFFERENTIAL METHOD BLD: NORMAL
EOSINOPHIL # BLD: 0.2 K/UL (ref 0–0.4)
EOSINOPHIL NFR BLD: 3 % (ref 0–7)
ERYTHROCYTE [DISTWIDTH] IN BLOOD BY AUTOMATED COUNT: 13.5 % (ref 11.5–14.5)
FERRITIN SERPL-MCNC: 31 NG/ML (ref 8–252)
GLOBULIN SER CALC-MCNC: 3.4 G/DL (ref 2–4)
GLUCOSE SERPL-MCNC: 78 MG/DL (ref 65–100)
GLUCOSE UR STRIP.AUTO-MCNC: NEGATIVE MG/DL
HCT VFR BLD AUTO: 42.3 % (ref 35–47)
HDLC SERPL-MCNC: 52 MG/DL
HDLC SERPL: 2.7 {RATIO} (ref 0–5)
HGB BLD-MCNC: 13.4 G/DL (ref 11.5–16)
HGB UR QL STRIP: NEGATIVE
IMM GRANULOCYTES # BLD AUTO: 0 K/UL (ref 0–0.04)
IMM GRANULOCYTES NFR BLD AUTO: 0 % (ref 0–0.5)
IRON SATN MFR SERPL: 43 % (ref 20–50)
IRON SERPL-MCNC: 128 UG/DL (ref 35–150)
KETONES UR QL STRIP.AUTO: NEGATIVE MG/DL
LDLC SERPL CALC-MCNC: 76 MG/DL (ref 0–100)
LEUKOCYTE ESTERASE UR QL STRIP.AUTO: NEGATIVE
LYMPHOCYTES # BLD: 1.6 K/UL (ref 0.8–3.5)
LYMPHOCYTES NFR BLD: 27 % (ref 12–49)
MCH RBC QN AUTO: 28 PG (ref 26–34)
MCHC RBC AUTO-ENTMCNC: 31.7 G/DL (ref 30–36.5)
MCV RBC AUTO: 88.5 FL (ref 80–99)
MONOCYTES # BLD: 0.5 K/UL (ref 0–1)
MONOCYTES NFR BLD: 9 % (ref 5–13)
NEUTS SEG # BLD: 3.6 K/UL (ref 1.8–8)
NEUTS SEG NFR BLD: 60 % (ref 32–75)
NITRITE UR QL STRIP.AUTO: NEGATIVE
NRBC # BLD: 0 K/UL (ref 0–0.01)
NRBC BLD-RTO: 0 PER 100 WBC
PH UR STRIP: 6 [PH] (ref 5–8)
PLATELET # BLD AUTO: 362 K/UL (ref 150–400)
PMV BLD AUTO: 11 FL (ref 8.9–12.9)
POTASSIUM SERPL-SCNC: 4.3 MMOL/L (ref 3.5–5.1)
PROT SERPL-MCNC: 7.3 G/DL (ref 6.4–8.2)
PROT UR STRIP-MCNC: NEGATIVE MG/DL
RBC # BLD AUTO: 4.78 M/UL (ref 3.8–5.2)
SODIUM SERPL-SCNC: 138 MMOL/L (ref 136–145)
SP GR UR REFRACTOMETRY: 1.02 (ref 1–1.03)
TIBC SERPL-MCNC: 298 UG/DL (ref 250–450)
TRIGL SERPL-MCNC: 50 MG/DL (ref ?–150)
TSH SERPL DL<=0.05 MIU/L-ACNC: 12.1 UIU/ML (ref 0.36–3.74)
UROBILINOGEN UR QL STRIP.AUTO: 0.2 EU/DL (ref 0.2–1)
VLDLC SERPL CALC-MCNC: 10 MG/DL
WBC # BLD AUTO: 6 K/UL (ref 3.6–11)

## 2022-02-22 PROCEDURE — 99203 OFFICE O/P NEW LOW 30 MIN: CPT | Performed by: FAMILY MEDICINE

## 2022-02-22 RX ORDER — ETODOLAC 400 MG/1
400 TABLET, FILM COATED ORAL
Qty: 30 TABLET | Refills: 0 | Status: SHIPPED | OUTPATIENT
Start: 2022-02-22 | End: 2022-03-07 | Stop reason: SDUPTHER

## 2022-02-22 RX ORDER — IBUPROFEN 200 MG
CAPSULE ORAL
COMMUNITY
End: 2022-02-22 | Stop reason: ALTCHOICE

## 2022-02-22 NOTE — Clinical Note
Please inform the patient return  either virtual or in clinic for abnormal lab result before oh 4 weeks appointment

## 2022-02-22 NOTE — PROGRESS NOTES
HISTORY OF PRESENT ILLNESS  Isela Chaidez is a 24 y.o. female. Today's Covid unvaccinated Pt main concerns were provided in the clinic,    pt is w/ comorbid history and   Pt has been trying to wear mask most of the times,      pt has no fever no cough no dyspnea, no ha, not dizzy, nl smell nl taste, no N/V/D, has no orbital pain,  no body ache. I was   HA   Started few yrs Ago, one sided pulsating lasting few hrs, has tried otc not helping, pt states that the HA Worsens by lights and loud noises, tried all the available med none helping, notpregnant IUD in place, father living helathy mother with mgiraine, stroke ,      the pain is associated with feeling of  Nausea, and pt hadno voimiting the pain is 7/10 at this time, it occures 2-4 times per wk,    if the HA occurs the pt is unable to do any work while having the HA,               Current Outpatient Medications   Medication Sig Dispense Refill    acetaminophen/dp-hydramine (EXCEDRIN PM PO) Take  by mouth.  ubrogepant Nova [place]) 100 mg tablet Take 1 Tablet by mouth once as needed for Migraine for up to 1 dose. 12 Tablet 1    etodolac (LODINE) 400 mg tablet Take 1 Tablet by mouth two (2) times daily as needed for Pain. 30 Tablet 0    [id number] you-folic-dha (PRENATAL DHA+COMPLETE PRENATAL) -300 mg-mcg-mg cmpk Take  by mouth. Indications: pregnancy (Patient not taking: Reported on 2/22/2022)      ferrous sulfate (IRON) 325 mg (65 mg iron) tablet Take  by mouth Daily (before breakfast). Indications: anemia from inadequate iron (Patient not taking: Reported on 2/22/2022)       No Known Allergies  Past Medical History:   Diagnosis Date    Anemia      History reviewed. No pertinent surgical history. History reviewed. No pertinent family history.   Social History     Tobacco Use    Smoking status: Never Smoker    Smokeless tobacco: Never Used   Substance Use Topics    Alcohol use: No      No results found for: CHOL, CHOLPOCT, HDL, LDL, LDLC, LDLCPOC, LDLCEXT, TRIGL, TGLPOCT, CHHD, CHHDX  Lab Results   Component Value Date/Time    PLATELET 733 68/29/1030 08:49 PM        Review of Systems   Constitutional: Negative for chills and fever. HENT: Negative for congestion and nosebleeds. Eyes: Negative for blurred vision and pain. Respiratory: Negative for cough, shortness of breath and wheezing. Cardiovascular: Negative for chest pain and leg swelling. Gastrointestinal: Negative for constipation, diarrhea, nausea and vomiting. Genitourinary: Negative for dysuria and frequency. Musculoskeletal: Negative for joint pain and myalgias. Skin: Negative for itching and rash. Neurological: Positive for headaches. Negative for dizziness and loss of consciousness. Psychiatric/Behavioral: Negative for depression. The patient is not nervous/anxious and does not have insomnia. Physical Exam  Vitals and nursing note reviewed. Constitutional:       Appearance: She is well-developed. HENT:      Head: Normocephalic and atraumatic. Eyes:      Conjunctiva/sclera: Conjunctivae normal.      Pupils: Pupils are equal, round, and reactive to light. Neck:      Thyroid: No thyromegaly. Vascular: No JVD. Cardiovascular:      Rate and Rhythm: Normal rate and regular rhythm. Heart sounds: Normal heart sounds. No murmur heard. No friction rub. No gallop. Pulmonary:      Effort: Pulmonary effort is normal. No respiratory distress. Breath sounds: Normal breath sounds. No stridor. No wheezing or rales. Abdominal:      General: Bowel sounds are normal. There is no distension. Palpations: Abdomen is soft. There is no mass. Tenderness: There is no abdominal tenderness. Musculoskeletal:         General: No tenderness. Normal range of motion. Lymphadenopathy:      Cervical: No cervical adenopathy. Skin:     Findings: No erythema or rash.    Neurological:      Mental Status: She is alert and oriented to person, place, and time. Cranial Nerves: No cranial nerve deficit. Deep Tendon Reflexes: Reflexes are normal and symmetric. Psychiatric:         Behavior: Behavior normal.         ASSESSMENT and PLAN  Diagnoses and all orders for this visit:    1. Family history of headache disorder  -     CT HEAD W WO CONT; Future  -     CBC WITH AUTOMATED DIFF; Future  -     CBC WITH AUTOMATED DIFF; Future  -     LIPID PANEL; Future  -     IRON PROFILE; Future  -     METABOLIC PANEL, COMPREHENSIVE; Future  -     FERRITIN; Future  -     TSH 3RD GENERATION; Future  -     URINALYSIS W/ RFLX MICROSCOPIC; Future    2. Family history of brain aneurysm  -     CT HEAD W WO CONT; Future  -     CBC WITH AUTOMATED DIFF; Future  -     CBC WITH AUTOMATED DIFF; Future  -     LIPID PANEL; Future  -     IRON PROFILE; Future  -     METABOLIC PANEL, COMPREHENSIVE; Future  -     FERRITIN; Future  -     TSH 3RD GENERATION; Future  -     URINALYSIS W/ RFLX MICROSCOPIC; Future    3. Intractable migraine with aura with status migrainosus    4. Thyromegaly  -     US THYROID/PARATHYROID/SOFT TISS; Future    Other orders  -     ubrogepant [place]) 100 mg tablet; Take 1 Tablet by mouth once as needed for Migraine for up to 1 dose. -     etodolac (LODINE) 400 mg tablet; Take 1 Tablet by mouth two (2) times daily as needed for Pain.

## 2022-02-22 NOTE — PROGRESS NOTES
Room 3     Identified pt with two pt identifiers(name and ). Reviewed record in preparation for visit and have obtained necessary documentation. All patient medications has been reviewed. Chief Complaint   Patient presents with    New Patient    Establish Care    Headache     pt states headaches satarted 2 years ago; pt gets them everyday; pt states somedays are gradual and sometimes it hits hard       3 most recent PHQ Screens 2022   Little interest or pleasure in doing things Not at all   Feeling down, depressed, irritable, or hopeless Not at all   Total Score PHQ 2 0     No flowsheet data found. Health Maintenance Due   Topic    Hepatitis C Screening     Depression Screen     COVID-19 Vaccine (1)    HPV Age 9Y-34Y (1 - 2-dose series)    Flu Vaccine (1)    DTaP/Tdap/Td series (1 - Tdap)    Pap Smear          Health Maintenance Review: Patient reminded of \"due or due soon\" health maintenance. I have asked the patient to contact his/her primary care provider (PCP) for follow-up on his/her health maintenance. Vitals:    22 1030   BP: 111/64   Pulse: 73   Resp: 18   Temp: 98.3 °F (36.8 °C)   TempSrc: Oral   SpO2: 99%   Weight: 127 lb (57.6 kg)   Height: 5' 3\" (1.6 m)   PainSc:   5   PainLoc: Head       Wt Readings from Last 3 Encounters:   22 127 lb (57.6 kg)   20 138 lb (62.6 kg) (68 %, Z= 0.47)*   20 138 lb (62.6 kg) (68 %, Z= 0.47)*     * Growth percentiles are based on CDC (Girls, 2-20 Years) data. Temp Readings from Last 3 Encounters:   22 98.3 °F (36.8 °C) (Oral)   20 97.8 °F (36.6 °C)   20 97.4 °F (36.3 °C)     BP Readings from Last 3 Encounters:   22 111/64   20 125/60   20 114/57     Pulse Readings from Last 3 Encounters:   22 73   20 68   20 94       Coordination of Care Questionnaire:   1) Have you been to an emergency room, urgent care, or hospitalized since your last visit?   no       2.  Have seen or consulted any other health care provider since your last visit? NO    Patient is accompanied by self I have received verbal consent from Sharan Leonard to discuss any/all medical information while they are present in the room.

## 2022-02-23 ENCOUNTER — DOCUMENTATION ONLY (OUTPATIENT)
Dept: FAMILY MEDICINE CLINIC | Age: 22
End: 2022-02-23

## 2022-03-07 RX ORDER — ETODOLAC 400 MG/1
400 TABLET, FILM COATED ORAL
Qty: 30 TABLET | Refills: 0 | Status: SHIPPED | OUTPATIENT
Start: 2022-03-07 | End: 2022-06-10 | Stop reason: ALTCHOICE

## 2022-03-07 NOTE — TELEPHONE ENCOUNTER
Patient is calling to get a refill on her:etodolac (LODINE) 400 mg tablet. Please call @[phone].

## 2022-03-14 DIAGNOSIS — Z82.49 FAMILY HISTORY OF BRAIN ANEURYSM: ICD-10-CM

## 2022-03-14 DIAGNOSIS — Z84.89: Primary | ICD-10-CM

## 2022-03-14 DIAGNOSIS — E03.9 HYPOTHYROIDISM, UNSPECIFIED TYPE: Primary | ICD-10-CM

## 2022-03-14 RX ORDER — LEVOTHYROXINE SODIUM 25 UG/1
25 TABLET ORAL
Qty: 30 TABLET | Refills: 0 | Status: SHIPPED | OUTPATIENT
Start: 2022-03-14 | End: 2022-04-19 | Stop reason: SDUPTHER

## 2022-03-14 NOTE — PROGRESS NOTES
Pt with fhx of brain aneurysm  And daily HA recently ordered CT scan not covered by her insurance company  Will do Neurologist referrral

## 2022-03-15 RX ORDER — SUMATRIPTAN 50 MG/1
50 TABLET, FILM COATED ORAL
Qty: 12 TABLET | Refills: 0 | Status: SHIPPED | OUTPATIENT
Start: 2022-03-15 | End: 2022-03-15

## 2022-03-16 ENCOUNTER — HOSPITAL ENCOUNTER (OUTPATIENT)
Dept: ULTRASOUND IMAGING | Age: 22
Discharge: HOME | End: 2022-03-16
Attending: FAMILY MEDICINE
Payer: COMMERCIAL

## 2022-03-16 ENCOUNTER — HOSPITAL ENCOUNTER (OUTPATIENT)
Dept: CT IMAGING | Age: 22
End: 2022-03-16
Attending: FAMILY MEDICINE
Payer: COMMERCIAL

## 2022-03-16 DIAGNOSIS — E01.0 THYROMEGALY: ICD-10-CM

## 2022-03-16 PROCEDURE — 76536 US EXAM OF HEAD AND NECK: CPT

## 2022-03-22 ENCOUNTER — OFFICE VISIT (OUTPATIENT)
Dept: FAMILY MEDICINE CLINIC | Age: 22
End: 2022-03-22
Payer: COMMERCIAL

## 2022-03-22 VITALS
HEART RATE: 84 BPM | WEIGHT: 126 LBS | BODY MASS INDEX: 22.32 KG/M2 | SYSTOLIC BLOOD PRESSURE: 115 MMHG | HEIGHT: 63 IN | OXYGEN SATURATION: 98 % | TEMPERATURE: 97.8 F | DIASTOLIC BLOOD PRESSURE: 77 MMHG | RESPIRATION RATE: 18 BRPM

## 2022-03-22 DIAGNOSIS — L03.311 CELLULITIS OF ABDOMINAL WALL: ICD-10-CM

## 2022-03-22 DIAGNOSIS — E04.1 THYROID NODULE GREATER THAN OR EQUAL TO 1 CM IN DIAMETER INCIDENTALLY NOTED ON IMAGING STUDY: ICD-10-CM

## 2022-03-22 DIAGNOSIS — Z09 HOSPITAL DISCHARGE FOLLOW-UP: Primary | ICD-10-CM

## 2022-03-22 PROCEDURE — 99215 OFFICE O/P EST HI 40 MIN: CPT | Performed by: FAMILY MEDICINE

## 2022-03-22 PROCEDURE — 1111F DSCHRG MED/CURRENT MED MERGE: CPT | Performed by: FAMILY MEDICINE

## 2022-03-22 RX ORDER — SUMATRIPTAN 50 MG/1
TABLET, FILM COATED ORAL
Qty: 12 TABLET | Refills: 0 | Status: SHIPPED | OUTPATIENT
Start: 2022-03-22 | End: 2022-06-10 | Stop reason: SDDI

## 2022-03-22 RX ORDER — CEPHALEXIN 500 MG/1
500 CAPSULE ORAL 3 TIMES DAILY
Qty: 21 CAPSULE | Refills: 0 | Status: SHIPPED | OUTPATIENT
Start: 2022-03-22 | End: 2022-03-22 | Stop reason: CLARIF

## 2022-03-22 RX ORDER — FLUCONAZOLE 150 MG/1
150 TABLET ORAL DAILY
Qty: 2 TABLET | Refills: 0 | Status: SHIPPED | OUTPATIENT
Start: 2022-03-22 | End: 2022-03-24

## 2022-03-22 RX ORDER — FLUCONAZOLE 150 MG/1
150 TABLET ORAL DAILY
Qty: 2 TABLET | Refills: 0 | Status: SHIPPED | OUTPATIENT
Start: 2022-03-22 | End: 2022-03-22 | Stop reason: SDUPTHER

## 2022-03-22 RX ORDER — SUMATRIPTAN 50 MG/1
TABLET, FILM COATED ORAL
COMMUNITY
Start: 2022-03-15 | End: 2022-03-22 | Stop reason: SDUPTHER

## 2022-03-22 RX ORDER — CEPHALEXIN 500 MG/1
500 CAPSULE ORAL 4 TIMES DAILY
Qty: 40 CAPSULE | Refills: 0 | Status: SHIPPED | OUTPATIENT
Start: 2022-03-22 | End: 2022-03-22 | Stop reason: SDUPTHER

## 2022-03-22 NOTE — PROGRESS NOTES
HISTORY OF PRESENT ILLNESS  Cayla Torres is a 24 y.o. female. A Covid vaccinated x3,and masked Denies flu like sxs, pt w/ comorbid history    HA   Started few yrs Ago, one sided pulsating lasting few hrs, has tried otc not helping, pt states that the HA Worsens by lights and loud noises,   the pain is associated with feeling of  Nausea, and pt hadno voimiting the pain is 7/10 at this time, it occures 2-4 times per wk,    if the HA occurs the pt is unable to do any work while having the HA,     Please schedule a postop visit with Dr. Rosie Lowe in 4 weeks, diagnostic laparoscopy. On 4 days of treatment    Was told to have had an ovarian cyst rupture with significant amout of pain, resolved currently and stable  Except for post surgical discomfort and skin   Which is intact but wet and pus and discharge      With the recent thyroid US results with nodularity, currently on thyroid meds  TSH 0.36 - 3.74 uIU/mL 12.10 High          Current Outpatient Medications   Medication Sig Dispense Refill    levothyroxine (SYNTHROID) 25 mcg tablet Take 1 Tablet by mouth Daily (before breakfast). 30 Tablet 0    etodolac (LODINE) 400 mg tablet Take 1 Tablet by mouth two (2) times daily as needed for Pain. 30 Tablet 0    acetaminophen/dp-hydramine (EXCEDRIN PM PO) Take  by mouth.  ferrous sulfate (IRON) 325 mg (65 mg iron) tablet Take  by mouth Daily (before breakfast). Indications: anemia from inadequate iron (Patient not taking: Reported on 2/22/2022)       No Known Allergies  Past Medical History:   Diagnosis Date    Anemia      No past surgical history on file. No family history on file.   Social History     Tobacco Use    Smoking status: Never Smoker    Smokeless tobacco: Never Used   Substance Use Topics    Alcohol use: No      Lab Results   Component Value Date/Time    WBC 6.0 02/22/2022 11:39 AM    HGB 13.4 02/22/2022 11:39 AM    HCT 42.3 02/22/2022 11:39 AM    PLATELET 137 34/34/3859 11:39 AM    MCV 88.5 02/22/2022 11:39 AM     Lab Results   Component Value Date/Time    Glucose 78 02/22/2022 11:39 AM    LDL, calculated 76 02/22/2022 11:39 AM    Creatinine 0.79 02/22/2022 11:39 AM         Review of Systems   Constitutional: Negative for chills and fever. HENT: Negative for congestion and nosebleeds. Eyes: Negative for blurred vision and pain. Respiratory: Negative for cough, shortness of breath and wheezing. Cardiovascular: Negative for chest pain and leg swelling. Gastrointestinal: Negative for constipation, diarrhea, nausea and vomiting. Genitourinary: Negative for dysuria and frequency. Musculoskeletal: Negative for joint pain and myalgias. Skin: Negative for itching and rash. Neurological: Negative for dizziness, loss of consciousness and headaches. Psychiatric/Behavioral: Negative for depression. The patient is not nervous/anxious and does not have insomnia. Physical Exam  Vitals and nursing note reviewed. Constitutional:       Appearance: She is well-developed. HENT:      Head: Normocephalic and atraumatic. Eyes:      Conjunctiva/sclera: Conjunctivae normal.      Pupils: Pupils are equal, round, and reactive to light. Neck:      Thyroid: No thyromegaly. Vascular: No JVD. Cardiovascular:      Rate and Rhythm: Normal rate and regular rhythm. Heart sounds: Normal heart sounds. No murmur heard. No friction rub. No gallop. Pulmonary:      Effort: Pulmonary effort is normal. No respiratory distress. Breath sounds: Normal breath sounds. No stridor. No wheezing or rales. Abdominal:      General: Bowel sounds are normal. There is no distension. Palpations: Abdomen is soft. There is no mass. Tenderness: There is no abdominal tenderness. Musculoskeletal:         General: No tenderness. Normal range of motion. Lymphadenopathy:      Cervical: No cervical adenopathy. Skin:     Findings: No erythema or rash.    Neurological:      Mental Status: She is alert and oriented to person, place, and time. Cranial Nerves: No cranial nerve deficit. Deep Tendon Reflexes: Reflexes are normal and symmetric. Psychiatric:         Behavior: Behavior normal.         ASSESSMENT and PLAN  Diagnoses and all orders for this visit:    1. Hospital discharge follow-up  -     [place] DISCHARGE MEDS RECONCILED W/ CURRENT OUTPATIENT MED LIST    2. Cellulitis of abdominal wall  -     fluconazole (DIFLUCAN) 150 mg tablet; Take 1 Tablet by mouth daily for 2 days. FDA advises cautious prescribing of oral fluconazole in pregnancy. 3. Thyroid nodule greater than or equal to 1 cm in diameter incidentally noted on imaging study  -     REFERRAL TO ENDOCRINOLOGY    Other orders  -     ubrogepant Stana Crigler) 50 mg tablet; Take 1 Tablet by mouth once as needed for Migraine (not exceed 4 tabs per day) for up to 1 dose. -     SUMAtriptan (IMITREX) 50 mg tablet; TAKE 1 TABLET BY MOUTH ONCE AS NEEDED FOR MIGRAINE (NOT EXCEED 4 TABS DAILY) FOR UP TO 1 DOSE. Transitional Care Management Progress Note    Patient: Lydia Pereira  : 2000  PCP: Charmaine Galicia MD    Date of office visit: 3/22/2022   Date of admission: 20  Date of discharge: 20  Hospital: [facility]    Call initiated w/i 2 business dates of discharge: *No response documented in the last 14 days   Date of the most recent call to the patient: *No documented post hospital discharge outreach found in the last 14 days      Assessment/Plan:     Diagnoses and all orders for this visit:    1. Hospital discharge follow-up  -     IA DISCHARGE MEDS RECONCILED W/ CURRENT OUTPATIENT MED LIST    2. Cellulitis of abdominal wall  -     fluconazole (DIFLUCAN) 150 mg tablet; Take 1 Tablet by mouth daily for 2 days. FDA advises cautious prescribing of oral fluconazole in pregnancy. Other orders  -     ubrogepant Stana Crigler) 50 mg tablet;  Take 1 Tablet by mouth once as needed for Migraine (not exceed 4 tabs per day) for up to 1 dose. -     SUMAtriptan (IMITREX) 50 mg tablet; TAKE 1 TABLET BY MOUTH ONCE AS NEEDED FOR MIGRAINE (NOT EXCEED 4 TABS DAILY) FOR UP TO 1 DOSE. The complexity of medical decision making for this patient's transitional care is high   Follow-up and Dispositions    · Return in 3 months (on 6/22/2022), or if symptoms worsen or fail to improve. Subjective:   Julio C Adams is a 24 y.o. female presenting today for follow-up after hospital discharge. This encounter and supporting documentation was reviewed if available. Medication reconciliation was performed today. The main problem requiring admission was       Complications during admission: none      Interval history/Current status: stable    Admitting symptoms have: improved      Medications marked \"taking\" at this time:  Prior to Admission medications    Medication Sig Start Date End Date Taking? Authorizing Provider   levothyroxine (SYNTHROID) 25 mcg tablet Take 1 Tablet by mouth Daily (before breakfast). 3/14/22  Yes Kemar Castellano MD   etodolac (LODINE) 400 mg tablet Take 1 Tablet by mouth two (2) times daily as needed for Pain. 3/7/22  Yes Kemar Castellano MD   SUMAtriptan (IMITREX) 50 mg tablet TAKE 1 TABLET BY MOUTH ONCE AS NEEDED FOR MIGRAINE (NOT EXCEED 4 TABS DAILY) FOR UP TO 1 DOSE. 3/15/22   Provider, Historical   ubrogepant Brain Feil) 50 mg tablet Take 1 Tablet by mouth once as needed for Migraine (not exceed 4 tabs per day) for up to 1 dose. 3/21/22 3/21/22  Kemar Castellano MD   acetaminophen/dp-hydramine (EXCEDRIN PM PO) Take  by mouth. Patient not taking: Reported on 3/22/2022    Provider, Historical   ferrous sulfate (IRON) 325 mg (65 mg iron) tablet Take  by mouth Daily (before breakfast). Indications: anemia from inadequate iron  Patient not taking: Reported on 2/22/2022    Provider, Historical          Constitutional: no chills and fever,  , nad     HENT: no ear pain or nosebleeds.  No blurred vision    Respiratory: no shortness of breath, wheezing cough     Cardiovascular: Has no chest pain, ,and racing heart . Gastrointestinal: No constipation, diarrhea, nausea and vomiting. Genitourinary: No frequency. Musculoskeletal: Negative for joint pain. Skin: no itching, no rash. Neurological: Negative for dizziness, no tremors  Psychiatric/Behavioral: Negative for depression   is not nervous/anxious. and not depressed the patient is not nervous/anxious. Current Outpatient Medications   Medication Sig Dispense Refill    levothyroxine (SYNTHROID) 25 mcg tablet Take 1 Tablet by mouth Daily (before breakfast). 30 Tablet 0    etodolac (LODINE) 400 mg tablet Take 1 Tablet by mouth two (2) times daily as needed for Pain. 30 Tablet 0    SUMAtriptan (IMITREX) 50 mg tablet TAKE 1 TABLET BY MOUTH ONCE AS NEEDED FOR MIGRAINE (NOT EXCEED 4 TABS DAILY) FOR UP TO 1 DOSE.  acetaminophen/dp-hydramine (EXCEDRIN PM PO) Take  by mouth. (Patient not taking: Reported on 3/22/2022)      ferrous sulfate (IRON) 325 mg (65 mg iron) tablet Take  by mouth Daily (before breakfast). Indications: anemia from inadequate iron (Patient not taking: Reported on 2/22/2022)       No Known Allergies  Past Medical History:   Diagnosis Date    Anemia      History reviewed. No pertinent surgical history. History reviewed. No pertinent family history.   Social History     Tobacco Use    Smoking status: Never Smoker    Smokeless tobacco: Never Used   Substance Use Topics    Alcohol use: No          Objective:     Visit Vitals  /77 (BP 1 Location: Right arm, BP Patient Position: Sitting, BP Cuff Size: Adult)   Pulse 84   Temp 97.8 °F (36.6 °C) (Oral)   Resp 18   Ht 5' 3\" (1.6 m)   Wt 126 lb (57.2 kg)   SpO2 98%   BMI 22.32 kg/m²          General:  alert cooperative appears stated for the age  HEENT; normocephalic and atraumatic PERRLA extraocular motor intact normal tympanic membrane normal external ear bilaterally, mucosal normal no drainage  Neck: supple no adenopathy no JVD no bruit  Lungs: Clear to auscultation bilaterally no rales rhonchi or wheezes  Heart: Normal regular S1-S2 ,  no murmur  Breast exam deferred  Abdomen: Soft nontender normal bowel sounds   Extremities: Normal range of motion no point tenderness normal pulses no edema  Pelvic/: No lesion, no lymphadenopathy  Skin: Normal no lesion no rash    We discussed the expected course, resolution and complications of the diagnosis(es) in detail. Medication risks, benefits, costs, interactions, and alternatives were discussed as indicated. I advised her to contact the office if her condition worsens, changes or fails to improve as anticipated. She expressed understanding with the diagnosis(es) and plan.      Debi Pina MD

## 2022-03-22 NOTE — PATIENT INSTRUCTIONS
Hemorrhagic Ovarian Cyst: Care Instructions  Overview     An ovarian cyst is a sac that forms on the ovary and swells up with fluid. If the cyst bleeds, it is called a hemorrhagic (say \"Sarah\") ovarian cyst. If a hemorrhagic cyst breaks open, it can release blood and fluid into the lower belly and pelvis. You may not have symptoms from the cyst. But if it is large, or if it twists or breaks open, you may have pain or other problems. You may feel pain from the cyst or have symptoms from losing blood. Your doctor may use a pelvic ultrasound to see if you have a cyst. Blood tests can help your doctor tell if the cyst is bleeding or you have lost a lot of blood. Treatment depends on your symptoms. If they are mild, your doctor may suggest carefully watching your symptoms and doing blood tests again. But if you have a cyst that is very large, bleeds a lot, or causes other problems, your doctor may suggest surgery to remove it. If the bleeding is heavy, you may also need treatment to replace the blood. Follow-up care is a key part of your treatment and safety. Be sure to make and go to all appointments, and call your doctor if you are having problems. It's also a good idea to know your test results and keep a list of the medicines you take. How can you care for yourself at home? · Use heat, such as a warm water bottle, a heating pad set on low, or a warm bath, to relax tense muscles and relieve cramping. · Be safe with medicines. Read and follow all instructions on the label. ? If the doctor gave you a prescription medicine for pain, take it as prescribed. ? If you are not taking a prescription pain medicine, ask your doctor if you can take an over-the-counter medicine. When should you call for help? Call 911 anytime you think you may need emergency care. For example, call if:    · You passed out (lost consciousness).    Call your doctor now or seek immediate medical care if:    · You have severe vaginal bleeding.     · You are dizzy or lightheaded, or you feel like you may faint.     · You have new or worse pain in your belly or pelvis. Watch closely for changes in your health, and be sure to contact your doctor if:    · You think you may be pregnant.     · You do not get better as expected. Where can you learn more? Go to http://www.gray.com/  Enter D256 in the search box to learn more about \"Hemorrhagic Ovarian Cyst: Care Instructions. \"  Current as of: November 22, 2021               Content Version: 13.2  © 9364-7294 [employer]. Care instructions adapted under license by [employer] (which disclaims liability or warranty for this information). If you have questions about a medical condition or this instruction, always ask your healthcare professional. [address] any warranty or liability for your use of this information.

## 2022-03-22 NOTE — PROGRESS NOTES
Identified pt with two pt identifiers(name and ). Reviewed record in preparation for visit and have obtained necessary documentation. All patient medications has been reviewed. Chief Complaint   Patient presents with   [facility] Follow Up       3 most recent [facility] 36 Screens 3/22/2022   Little interest or pleasure in doing things Not at all   Feeling down, depressed, irritable, or hopeless Not at all   Total Score PHQ 2 0     No flowsheet data found. Health Maintenance Due   Topic    Hepatitis C Screening     COVID-19 Vaccine (1)    HPV Age 9Y-34Y (1 - 2-dose series)    Flu Vaccine (1)    DTaP/Tdap/Td series (1 - Tdap)    Pap Smear      Health Maintenance Review: Patient reminded of \"due or due soon\" health maintenance. I have asked the patient to contact his/her primary care provider (PCP) for follow-up on his/her health maintenance. Vitals:    22 0951   BP: 115/77   Pulse: 84   Resp: 18   Temp: 97.8 °F (36.6 °C)   TempSrc: Oral   SpO2: 98%   Weight: 126 lb (57.2 kg)   Height: 5' 3\" (1.6 m)       Wt Readings from Last 3 Encounters:   22 126 lb (57.2 kg)   22 127 lb (57.6 kg)   20 138 lb (62.6 kg) (68 %, Z= 0.47)*     * Growth percentiles are based on CDC (Girls, 2-20 Years) data. Temp Readings from Last 3 Encounters:   22 97.8 °F (36.6 °C) (Oral)   22 98.3 °F (36.8 °C) (Oral)   20 97.8 °F (36.6 °C)     BP Readings from Last 3 Encounters:   22 115/77   22 111/64   20 125/60     Pulse Readings from Last 3 Encounters:   22 84   22 73   20 68       1. \"Have you been to the ER, urgent care clinic since your last visit? Hospitalized since your last visit? \" Yes     2. \"Have you seen or consulted any other health care providers outside of the [address] since your last visit? \"  Yes     3. For patients aged 39-70: Has the patient had a colonoscopy / FIT/ Cologuard? No      If the patient is female:    4.  For patients aged 41-77: Has the patient had a mammogram within the past 2 years? NA - based on age or sex      11. For patients aged 21-65: Has the patient had a pap smear?  No

## 2022-03-29 ENCOUNTER — TELEPHONE (OUTPATIENT)
Dept: FAMILY MEDICINE CLINIC | Age: 22
End: 2022-03-29

## 2022-03-29 NOTE — TELEPHONE ENCOUNTER
----- Message from Jacklyn Radha sent at 3/29/2022 12:10 PM EDT -----  Subject: Message to Provider    QUESTIONS  Information for Provider? Patient called back today stating cephALEXin   (KEFLEX) 500 mg capsule are not working and she was told to call back if   different antibiotics were needed. Patient did not want another visit with   PCP just would like new medication. Please follow up with patient.   ---------------------------------------------------------------------------  --------------  CALL BACK INFO  What is the best way for the office to contact you? OK to leave message on   voicemail, OK to respond with electronic message via [employer] portal (only   for patients who have registered [employer] account)  Preferred Call Back Phone Number? [phone]  ---------------------------------------------------------------------------  --------------  SCRIPT ANSWERS  Relationship to Patient?  Self

## 2022-03-30 RX ORDER — RIMEGEPANT SULFATE 75 MG/75MG
75 TABLET, ORALLY DISINTEGRATING ORAL
Qty: 20 TABLET | Refills: 2 | Status: SHIPPED | OUTPATIENT
Start: 2022-03-30 | End: 2022-03-30

## 2022-04-11 ENCOUNTER — PATIENT MESSAGE (OUTPATIENT)
Dept: FAMILY MEDICINE CLINIC | Age: 22
End: 2022-04-11

## 2022-04-18 DIAGNOSIS — E03.9 HYPOTHYROIDISM, UNSPECIFIED TYPE: ICD-10-CM

## 2022-04-19 ENCOUNTER — VIRTUAL VISIT (OUTPATIENT)
Dept: FAMILY MEDICINE CLINIC | Age: 22
End: 2022-04-19
Payer: COMMERCIAL

## 2022-04-19 DIAGNOSIS — E04.1 THYROID NODULE GREATER THAN OR EQUAL TO 1 CM IN DIAMETER INCIDENTALLY NOTED ON IMAGING STUDY: ICD-10-CM

## 2022-04-19 DIAGNOSIS — E01.0 THYROMEGALY: ICD-10-CM

## 2022-04-19 DIAGNOSIS — G43.111 INTRACTABLE MIGRAINE WITH AURA WITH STATUS MIGRAINOSUS: ICD-10-CM

## 2022-04-19 DIAGNOSIS — Z02.89 ENCOUNTER TO OBTAIN EXCUSE FROM WORK: ICD-10-CM

## 2022-04-19 DIAGNOSIS — E03.9 HYPOTHYROIDISM, UNSPECIFIED TYPE: Primary | ICD-10-CM

## 2022-04-19 PROCEDURE — 99214 OFFICE O/P EST MOD 30 MIN: CPT | Performed by: FAMILY MEDICINE

## 2022-04-19 RX ORDER — ATOGEPANT 60 MG/1
60 TABLET ORAL DAILY
Qty: 30 TABLET | Refills: 4 | Status: SHIPPED | OUTPATIENT
Start: 2022-04-19 | End: 2022-06-10 | Stop reason: ALTCHOICE

## 2022-04-19 RX ORDER — ATOGEPANT 30 MG/1
30 TABLET ORAL DAILY
Qty: 30 TABLET | Refills: 1 | Status: CANCELLED | OUTPATIENT
Start: 2022-04-19

## 2022-04-19 RX ORDER — LEVOTHYROXINE SODIUM 25 UG/1
25 TABLET ORAL
Qty: 30 TABLET | Refills: 0 | Status: SHIPPED | OUTPATIENT
Start: 2022-04-19 | End: 2022-05-18 | Stop reason: SDUPTHER

## 2022-04-19 NOTE — PROGRESS NOTES
HISTORY OF PRESENT ILLNESS  Al Reyes is a 24 y.o. female, A Covid vaccinated and nicely masked Denies flu like sxs, with current medical history of Migraine Headache,hypothyroidism, and thyromegaly   present with the following concern for f/u hypothyrodism present with the following complaint of  daily HA, stating it Started few yrs Ago, one sided pulsating,  lasting few hrs, has tried otc not helping, pt states that the HA Worsenining by lights and loud noises,    the pain is associated with feeling of  Nausea, and pt had no voimiting the pain is 7/10 at this time, it occurs 5-6 times per wk,    if the HA occurs the pt is unable to do any work while having the HA,   Stating that she is unable to do her job, not able to get rid of the HA yet,has tried all the available OTC none has been helping, getting stressed out about it, and  ,   patient also states that this happened in the past and no medication was able to help the serious medical condition except for resting at home,          Patient is currently mentally alert and cognitively functional to operate machinery, and motivated and willing to Go to work full time but unable to function at this time due to MARY, HA, lack of sleep. Hoplessness, ns/nh,ni,nh, no tendency of etoh or illicit drug use, more ability of sleep, more ablitiy  to concentrate at work  and at home with the current medications,and all together a safe feeling at home and at work      Current Outpatient Medications   Medication Sig Dispense Refill    SUMAtriptan (IMITREX) 50 mg tablet TAKE 1 TABLET BY MOUTH ONCE AS NEEDED FOR MIGRAINE (NOT EXCEED 4 TABS DAILY) FOR UP TO 1 DOSE. 12 Tablet 0    levothyroxine (SYNTHROID) 25 mcg tablet Take 1 Tablet by mouth Daily (before breakfast). 30 Tablet 0    etodolac (LODINE) 400 mg tablet Take 1 Tablet by mouth two (2) times daily as needed for Pain.  30 Tablet 0     No Known Allergies  Past Medical History:   Diagnosis Date    Anemia      No past surgical history on file. No family history on file. Social History     Tobacco Use    Smoking status: Never Smoker    Smokeless tobacco: Never Used   Substance Use Topics    Alcohol use: No      Lab Results   Component Value Date/Time    Cholesterol, total 138 02/22/2022 11:39 AM    HDL Cholesterol 52 02/22/2022 11:39 AM    LDL, calculated 76 02/22/2022 11:39 AM    Triglyceride 50 02/22/2022 11:39 AM    CHOL/HDL Ratio 2.7 02/22/2022 11:39 AM     Lab Results   Component Value Date/Time    TSH 12.10 (H) 02/22/2022 11:39 AM         Review of Systems   Constitutional: Negative for chills and fever. HENT: Negative for congestion and nosebleeds. Eyes: Negative for blurred vision and pain. Respiratory: Negative for cough, shortness of breath and wheezing. Cardiovascular: Negative for chest pain and leg swelling. Gastrointestinal: Negative for constipation, diarrhea, nausea and vomiting. Genitourinary: Negative for dysuria and frequency. Musculoskeletal: Negative for joint pain and myalgias. Skin: Negative for itching and rash. Neurological: Negative for dizziness, loss of consciousness and headaches. Psychiatric/Behavioral: Negative for depression. The patient is not nervous/anxious and does not have insomnia. Physical Exam  Vitals and nursing note reviewed. Constitutional:       Appearance: She is well-developed. HENT:      Head: Normocephalic and atraumatic. Eyes:      Conjunctiva/sclera: Conjunctivae normal.      Pupils: Pupils are equal, round, and reactive to light. Neck:      Thyroid: No thyromegaly. Vascular: No JVD. Cardiovascular:      Rate and Rhythm: Normal rate and regular rhythm. Heart sounds: Normal heart sounds. No murmur heard. No friction rub. No gallop. Pulmonary:      Effort: Pulmonary effort is normal. No respiratory distress. Breath sounds: Normal breath sounds. No stridor. No wheezing or rales.    Abdominal:      General: Bowel sounds are normal. There is no distension. Palpations: Abdomen is soft. There is no mass. Tenderness: There is no abdominal tenderness. Musculoskeletal:         General: No tenderness. Normal range of motion. Lymphadenopathy:      Cervical: No cervical adenopathy. Skin:     Findings: No erythema or rash. Neurological:      Mental Status: She is alert and oriented to person, place, and time. Cranial Nerves: No cranial nerve deficit. Deep Tendon Reflexes: Reflexes are normal and symmetric. Psychiatric:         Behavior: Behavior normal.         ASSESSMENT and PLAN  Diagnoses and all orders for this visit:    1. Hypothyroidism, unspecified type  -     levothyroxine (SYNTHROID) 25 mcg tablet; Take 1 Tablet by mouth Daily (before breakfast). 2. Intractable migraine with aura with status migrainosus    3. Thyromegaly    4. Thyroid nodule greater than or equal to 1 cm in diameter incidentally noted on imaging study    5. Encounter to obtain excuse from work    Other orders  -     atogepant (Bladimir Polka) 60 mg tab; Take 60 mg by mouth daily. -     ubrogepant Tylene Limber) 50 mg tablet; Take 1 Tablet by mouth once as needed for Migraine (not exceed 4 tabs per day) for up to 1 dose.       Secondary to the patient acute medical conditions, a letter on the pt's behalf was completed, pt's multiple questions answered to the best knowledge,  will keep a copy in the chart for further correspondence, patient was informed about the safety and  regulations regarding this condition patient was also advised to follow-up with HR for further guidelines patient acknowledged understanding and agreed with today's recommendations

## 2022-04-19 NOTE — PROGRESS NOTES
Chief Complaint   Patient presents with    Headache     1. Have you been to the ER, urgent care clinic since your last visit? Hospitalized since your last visit? No    2. Have you seen or consulted any other health care providers outside of the [address] [place] [address] since your last visit? Include any pap smears or colon screening. No     Call placed to pt. Verified patient with two type of identifiers. C/o daily headaches,  Taking ubrelvy with relief but  Requesting something to prevent her headaches. imitrex does not help her headaches.   imitrex gives her throat pain, hot flashes and nausee

## 2022-04-19 NOTE — LETTER
NOTIFICATION RETURN TO WORK / SCHOOL        4/19/2022 4:07 PM    Ms. Mariano [facility] [address] [facility]    To Whom It May Concern:    Holden Oh is currently under the care of [address] [place] [address] OFFICE-[facility]. She will be excused from attending work from 4/19/2022 due to the current medical condition and she will     be able to return to work/school on: 05/03/2022. If there are questions or concerns please have the patient contact our office.         Sincerely,      Delio Austin MD

## 2022-04-19 NOTE — TELEPHONE ENCOUNTER
Last visit:3/22/22  Next visit:6/22/22  Previous refill 3/14/22(30+0R)    Requested Prescriptions     Pending Prescriptions Disp Refills    levothyroxine (SYNTHROID) 25 mcg tablet 30 Tablet 0     Sig: Take 1 Tablet by mouth Daily (before breakfast).

## 2022-04-20 RX ORDER — LEVOTHYROXINE SODIUM 25 UG/1
25 TABLET ORAL
Qty: 30 TABLET | Refills: 0 | Status: SHIPPED | OUTPATIENT
Start: 2022-04-20 | End: 2022-06-13

## 2022-05-02 ENCOUNTER — TELEPHONE (OUTPATIENT)
Dept: FAMILY MEDICINE CLINIC | Age: 22
End: 2022-05-02

## 2022-05-02 NOTE — TELEPHONE ENCOUNTER
----- Message from Saundra Bryant sent at 5/2/2022 10:48 AM EDT -----  Subject: Message to Provider    QUESTIONS  Information for Provider? Pt was told she needs to have a prior auth for   ubrogepant Aimee Passy) 100 mg tablet. Please call pt  ---------------------------------------------------------------------------  --------------  CALL BACK INFO  What is the best way for the office to contact you? OK to leave message on   voicemail  Preferred Call Back Phone Number? [phone]  ---------------------------------------------------------------------------  --------------  SCRIPT ANSWERS  Relationship to Patient?  Self

## 2022-05-18 ENCOUNTER — VIRTUAL VISIT (OUTPATIENT)
Dept: FAMILY MEDICINE CLINIC | Age: 22
End: 2022-05-18
Payer: COMMERCIAL

## 2022-05-18 DIAGNOSIS — G43.111 INTRACTABLE MIGRAINE WITH AURA WITH STATUS MIGRAINOSUS: Primary | ICD-10-CM

## 2022-05-18 DIAGNOSIS — Z71.89 ADVICE GIVEN ABOUT COVID-19 VIRUS INFECTION: ICD-10-CM

## 2022-05-18 PROCEDURE — 99213 OFFICE O/P EST LOW 20 MIN: CPT | Performed by: FAMILY MEDICINE

## 2022-05-18 NOTE — PROGRESS NOTES
HISTORY OF PRESENT ILLNESS  Damaris Patterson is a 24 y.o. female,       Pt's main concerns were provided on virtual video format visit, a telemed format, COVID-19 vaccinated pt is w/ comorbid medical history , Pt Have been trying to stay safe  ,        Started few yrs Ago,  tried otc not helping,   Worsens by lights and loud noises,  the pain is associated with feeling of Nausea, and pt had no had frequent vomiting, , has been taking over-the-counter Tylenol Excedrin Advil Prn last 30 days including Ubrelvy 50 mg prn, has tried other long acting not able to effort the cost,  Overall none been helping and  states that the patient is compliant with the advise regarding sleeping in a quiet room and resting unfortunately patient states that none has been helping,  I     the pain is at  8/10 patient states that it can come anytime without warning once or twice per month the pain is on tolerable  Current Outpatient Medications   Medication Sig Dispense Refill    levothyroxine (SYNTHROID) 25 mcg tablet Take 1 Tablet by mouth Daily (before breakfast). 30 Tablet 0    SUMAtriptan (IMITREX) 50 mg tablet TAKE 1 TABLET BY MOUTH ONCE AS NEEDED FOR MIGRAINE (NOT EXCEED 4 TABS DAILY) FOR UP TO 1 DOSE. 12 Tablet 0    etodolac (LODINE) 400 mg tablet Take 1 Tablet by mouth two (2) times daily as needed for Pain. 30 Tablet 0    atogepant (Qulipta) 60 mg tab Take 60 mg by mouth daily. (Patient not taking: Reported on 5/18/2022) 30 Tablet 4     No Known Allergies  Past Medical History:   Diagnosis Date    Anemia      No past surgical history on file. No family history on file.   Social History     Tobacco Use    Smoking status: Never Smoker    Smokeless tobacco: Never Used   Substance Use Topics    Alcohol use: No      Lab Results   Component Value Date/Time    Cholesterol, total 138 02/22/2022 11:39 AM    HDL Cholesterol 52 02/22/2022 11:39 AM    LDL, calculated 76 02/22/2022 11:39 AM    Triglyceride 50 02/22/2022 11:39 AM CHOL/HDL Ratio 2.7 02/22/2022 11:39 AM     Lab Results   Component Value Date/Time    ALT (SGPT) 19 02/22/2022 11:39 AM    Alk. phosphatase 46 02/22/2022 11:39 AM    Bilirubin, total 0.6 02/22/2022 11:39 AM    Albumin 3.9 02/22/2022 11:39 AM    Protein, total 7.3 02/22/2022 11:39 AM    PLATELET 835 03/57/2828 11:39 AM        Review of Systems   Constitutional: Negative for chills and fever. HENT: Negative for congestion and nosebleeds. Eyes: Negative for blurred vision and pain. Respiratory: Negative for cough, shortness of breath and wheezing. Cardiovascular: Negative for chest pain and leg swelling. Gastrointestinal: Negative for constipation, diarrhea, nausea and vomiting. Genitourinary: Negative for dysuria and frequency. Musculoskeletal: Negative for joint pain and myalgias. Skin: Negative for itching and rash. Neurological: Positive for headaches. Negative for dizziness and loss of consciousness. Psychiatric/Behavioral: Negative for depression. The patient is not nervous/anxious and does not have insomnia. Physical Exam  Constitutional:       Appearance: She is not ill-appearing or toxic-appearing. HENT:      Head: Normocephalic and atraumatic. Mouth/Throat:      Mouth: Mucous membranes are moist.   Neurological:      Mental Status: She is alert and oriented to person, place, and time. Psychiatric:         Mood and Affect: Mood normal.         Behavior: Behavior normal.         Thought Content: Thought content normal.         Judgment: Judgment normal.         ASSESSMENT and PLAN  Diagnoses and all orders for this visit:    1. Intractable migraine with aura with status migrainosus  -     rimegepant (NURTEC) 75 mg disintegrating tablet; Take 1 Tablet by mouth once as needed for Migraine for up to 1 dose.     2. Advice given about COVID-19 virus infection          Exedrin otc prn, lie in darkened room and apply cold packs prn for pain, will start on prophylactic therapy with triptan therapy due to high frequency of pain, side effect profile discussed in detail, behavioral modification and exercise regimen asked to keep headache diary, avoid alcohol, fatty or fried foods, smoking, stressful situations as much as practical and the use of illicit or street drugs.

## 2022-05-18 NOTE — PROGRESS NOTES
Chief Complaint   Patient presents with    Headache     1. Have you been to the ER, urgent care clinic since your last visit? Hospitalized since your last visit? No    2. Have you seen or consulted any other health care providers outside of the [address] [place] [address] since your last visit? Include any pap smears or colon screening. No    Call placed to pt. Verified patient with two type of identifiers. c/o headache x 5 days, taking prescribed meds with no relief.

## 2022-05-18 NOTE — LETTER
NOTIFICATION RETURN TO WORK / SCHOOL        5/18/2022 4:40 PM    Ms. Mariano [facility] [address] [facility]      To Whom It May Concern:    Elizabeth Dumont is currently under the care of [address] OFFICE-[facility]. She will return to work/school on: 5/23/2022    If there are questions or concerns please have the patient contact our office.         Sincerely,      Feng Deshpande MD

## 2022-06-10 ENCOUNTER — OFFICE VISIT (OUTPATIENT)
Dept: FAMILY MEDICINE CLINIC | Age: 22
End: 2022-06-10
Payer: COMMERCIAL

## 2022-06-10 VITALS
WEIGHT: 124.5 LBS | BODY MASS INDEX: 22.06 KG/M2 | TEMPERATURE: 99.3 F | HEART RATE: 80 BPM | RESPIRATION RATE: 18 BRPM | DIASTOLIC BLOOD PRESSURE: 62 MMHG | OXYGEN SATURATION: 98 % | HEIGHT: 63 IN | SYSTOLIC BLOOD PRESSURE: 100 MMHG

## 2022-06-10 DIAGNOSIS — E04.2 MULTIPLE THYROID NODULES: Primary | ICD-10-CM

## 2022-06-10 PROCEDURE — 99213 OFFICE O/P EST LOW 20 MIN: CPT | Performed by: FAMILY MEDICINE
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
## 2022-06-11 LAB
T3FREE SERPL-MCNC: 2.7 PG/ML (ref 2.2–4)
T4 FREE SERPL-MCNC: 0.9 NG/DL (ref 0.8–1.5)
TSH SERPL DL<=0.05 MIU/L-ACNC: 10.5 UIU/ML (ref 0.36–3.74)

## 2022-06-13 DIAGNOSIS — E03.9 HYPOTHYROIDISM, UNSPECIFIED TYPE: ICD-10-CM

## 2022-06-13 RX ORDER — LEVOTHYROXINE SODIUM 25 UG/1
37.5 TABLET ORAL
Qty: 90 TABLET | Refills: 1 | Status: SHIPPED | OUTPATIENT
Start: 2022-06-13 | End: 2022-10-03

## 2022-06-13 RX ORDER — UBROGEPANT 50 MG/1
TABLET ORAL
COMMUNITY
Start: 2022-06-10 | End: 2022-08-16

## 2022-06-15 ENCOUNTER — DOCUMENTATION ONLY (OUTPATIENT)
Dept: FAMILY MEDICINE CLINIC | Age: 22
End: 2022-06-15

## 2022-06-18 ENCOUNTER — HOSPITAL ENCOUNTER (OUTPATIENT)
Dept: ULTRASOUND IMAGING | Age: 22
Discharge: HOME | End: 2022-06-18
Attending: FAMILY MEDICINE
Payer: COMMERCIAL

## 2022-06-18 DIAGNOSIS — E04.2 MULTIPLE THYROID NODULES: ICD-10-CM

## 2022-06-18 PROCEDURE — 76536 US EXAM OF HEAD AND NECK: CPT
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
## 2022-07-26 ENCOUNTER — DOCUMENTATION ONLY (OUTPATIENT)
Dept: FAMILY MEDICINE CLINIC | Age: 22
End: 2022-07-26

## 2022-08-01 DIAGNOSIS — E04.2 MULTIPLE THYROID NODULES: ICD-10-CM

## 2022-08-16 ENCOUNTER — OFFICE VISIT (OUTPATIENT)
Dept: FAMILY MEDICINE CLINIC | Age: 22
End: 2022-08-16
Payer: COMMERCIAL

## 2022-08-16 VITALS
RESPIRATION RATE: 18 BRPM | DIASTOLIC BLOOD PRESSURE: 79 MMHG | WEIGHT: 118 LBS | HEART RATE: 101 BPM | BODY MASS INDEX: 20.91 KG/M2 | SYSTOLIC BLOOD PRESSURE: 107 MMHG | OXYGEN SATURATION: 99 % | TEMPERATURE: 99.5 F | HEIGHT: 63 IN

## 2022-08-16 DIAGNOSIS — B00.2 HERPES GINGIVOSTOMATITIS: Primary | ICD-10-CM

## 2022-08-16 PROCEDURE — 99213 OFFICE O/P EST LOW 20 MIN: CPT | Performed by: STUDENT IN AN ORGANIZED HEALTH CARE EDUCATION/TRAINING PROGRAM

## 2022-08-16 RX ORDER — LIDOCAINE HYDROCHLORIDE 20 MG/ML
SOLUTION OROPHARYNGEAL
Qty: 100 ML | Refills: 0 | Status: SHIPPED | OUTPATIENT
Start: 2022-08-16 | End: 2022-10-03

## 2022-08-16 RX ORDER — VALACYCLOVIR HYDROCHLORIDE 1 G/1
1000 TABLET, FILM COATED ORAL 2 TIMES DAILY
Qty: 20 TABLET | Refills: 0 | Status: SHIPPED | OUTPATIENT
Start: 2022-08-16 | End: 2022-10-03

## 2022-08-16 RX ORDER — TRIAMCINOLONE ACETONIDE 1 MG/G
PASTE DENTAL
Qty: 5 G | Refills: 0 | Status: SHIPPED | OUTPATIENT
Start: 2022-08-16 | End: 2022-10-03

## 2022-08-16 NOTE — PROGRESS NOTES
Name and  Verified. Patient of Dr. Shonda Agarwal verified     Chief Complaint   Patient presents with    Mouth Pain     X 3 days      Stated since Saturday went to ED [place]. Having trouble eating and swallowing. Tested for COVID & Strep- Negative. Very painful sores pain scale 9 out of 10    1. Have you been to the ER, urgent care clinic since your last visit? Hospitalized since your last visit? Yes  2022  Mouth Sores  [facility]    2. Have you seen or consulted any other health care providers outside of the [address] [place] [address] since your last visit? Include any pap smears or colon screening.  No

## 2022-08-16 NOTE — PROGRESS NOTES
[facility]  [id number]  [facility]. Hailee Bird[address] [place] [address]  [phone]    Chief Complaint: Mouth sores    Subjective  Cherri Butt is a 24 y.o. WHITE/NON- female , established patient, here for evaluation of the concern(s) above;    Mouth sores:    Patient states that she developed a fever about 4 days ago while at the beach. The fever resolved but she later developed mouth sores and sore throat for which she went to a free standing ER 2 days ago and they tested covid and strep which were negative. She continue to have worsening mouth sores on her lips, tongue and gums making it difficult to eat that she is in tears because of the pain. No longer has any fever. No cough, headache, chest pain or sob. Allergies - reviewed:   No Known Allergies    Past Medical History - reviewed:  Past Medical History:   Diagnosis Date    Anemia        Depression screening:  3 most recent PHQ Screens 6/10/2022   Little interest or pleasure in doing things Not at all   Feeling down, depressed, irritable, or hopeless Not at all   Total Score PHQ 2 0         Review of systems:      A comprehensive review of systems was negative except for that written in the History of Present Illness. Physical Exam  Visit Vitals  /79 (BP 1 Location: Right arm, BP Patient Position: Sitting, BP Cuff Size: Adult)   Pulse (!) 101   Temp 99.5 °F (37.5 °C) (Oral)   Resp 18   Ht 5' 3\" (1.6 m)   Wt 118 lb (53.5 kg)   SpO2 99%   BMI 20.90 kg/m²       General: Alert and oriented, in no acute distress. Well nourished. EYE: PERRL. Sclera and conjuctival clear. Extraocular movements intact. EARS: External normal, canals clear, tympanic membranes normal.   NOSE: Mucosa healthy without drainage or ulceration. OROPHARYNX: Multiple small ulcers on the right lower lip, gum, and tongue. . Throat is clear.   NECK: Supple; no masses; thyroid normal.  LYMPH NODES: No significant cervial lymphadenopathy. LUNGS: Respirations unlabored; clear to auscultation bilaterally. CARDIOVASCULAR: Regular, rate, and rhythm without murmurs, gallops or rubs. Neurological: Alert and oriented X 3, normal strength and tone. Normal symmetric reflexes. Normal coordination and gait       Assessment/Plan    ICD-10-CM ICD-9-CM    1. Herpes gingivostomatitis  B00.2 054.2 valACYclovir (VALTREX) 1 gram tablet      triamcinolone acetonide (KENALOG) 0.1 % dental paste      lidocaine (XYLOCAINE) 2 % solution        1. Herpes gingivostomatitis  - valACYclovir (VALTREX) 1 gram tablet; Take 1 Tablet by mouth two (2) times a day. - triamcinolone acetonide (KENALOG) 0.1 % dental paste; Apply thin layer to the affected area twice daily after breakfast and after diner    - lidocaine (XYLOCAINE) 2 % solution; TAKE 10 ml switch and spit 4 times daily before meals and at bedtime.  - Avoid kissing or sharing utensils during outbreak.  - RTC if any new, persistent or worsening symptoms. Follow up:  as needed    We discussed the expected course, resolution and complications of the diagnosis(es) in detail. Medication risks, benefits, costs, interactions, and alternatives were discussed as indicated. I advised him to contact the office if his condition worsens, changes or fails to improve as anticipated. He expressed understanding with the diagnosis(es) and plan. Patient understands that this encounter was a temporary measure, and the importance of further follow up and examination was emphasized. Patient verbalized understanding.       Signed By: Deana Talamantes MD     August 16, 2022

## 2022-08-16 NOTE — PATIENT INSTRUCTIONS
Herpes Gingivostomatitis    - Pain control: Lidoacaine 2% mucous membrane solution; Take 10 ml, switch in mouth and spit 4 times daily (before meals and at bedtime)  - Valtrex 1 g, twice daily for 10 days  - For ulcers - start tirmcinolone topical 0.1% mucous membrane paste, apply thin layer to affected area twice daily (morning and and at bedtime) for 7 days  - Avoid sharing utensils and kissing until symptoms resolve.

## 2022-09-30 ENCOUNTER — OFFICE VISIT (OUTPATIENT)
Dept: FAMILY MEDICINE CLINIC | Age: 22
End: 2022-09-30
Payer: COMMERCIAL

## 2022-09-30 VITALS
BODY MASS INDEX: 21.51 KG/M2 | HEIGHT: 63 IN | RESPIRATION RATE: 16 BRPM | WEIGHT: 121.4 LBS | OXYGEN SATURATION: 99 % | SYSTOLIC BLOOD PRESSURE: 113 MMHG | DIASTOLIC BLOOD PRESSURE: 73 MMHG | HEART RATE: 61 BPM | TEMPERATURE: 98.2 F

## 2022-09-30 DIAGNOSIS — E03.9 HYPOTHYROIDISM, UNSPECIFIED TYPE: Primary | ICD-10-CM

## 2022-09-30 DIAGNOSIS — G43.111 INTRACTABLE MIGRAINE WITH AURA WITH STATUS MIGRAINOSUS: ICD-10-CM

## 2022-09-30 DIAGNOSIS — K62.5 RECTAL BLEEDING: ICD-10-CM

## 2022-09-30 DIAGNOSIS — Z11.59 NEED FOR HEPATITIS C SCREENING TEST: ICD-10-CM

## 2022-09-30 DIAGNOSIS — E04.2 MULTIPLE THYROID NODULES: ICD-10-CM

## 2022-09-30 DIAGNOSIS — R53.83 FATIGUE, UNSPECIFIED TYPE: ICD-10-CM

## 2022-09-30 DIAGNOSIS — R45.86 MOOD SWINGS: ICD-10-CM

## 2022-09-30 PROCEDURE — 99214 OFFICE O/P EST MOD 30 MIN: CPT | Performed by: NURSE PRACTITIONER

## 2022-09-30 RX ORDER — DIVALPROEX SODIUM 500 MG/1
500 TABLET, EXTENDED RELEASE ORAL
Qty: 90 TABLET | Refills: 1 | Status: SHIPPED | OUTPATIENT
Start: 2022-09-30

## 2022-09-30 NOTE — Clinical Note
Check VIIS for vaccines If not in VIIS, get vaccine report from pediatrician - Dr. Marcela Coleman, [facility] Thanks

## 2022-09-30 NOTE — PROGRESS NOTES
Chief Complaint   Patient presents with    [facility]     new patient        1. \"Have you been to the ER, urgent care clinic since your last visit? Hospitalized since your last visit? \" No    2. \"Have you seen or consulted any other health care providers outside of the [address] [place] [address] since your last visit? \" No     3. For patients aged 39-70: Has the patient had a colonoscopy / FIT/ Cologuard? NA - based on age      If the patient is female:    4. For patients aged 41-77: Has the patient had a mammogram within the past 2 years? NA - based on age or sex      11. For patients aged 21-65: Has the patient had a pap smear? Yes - Care Gap present.  Rooming MA/LPN to request most recent results Dr Darwin Castro [address] [facility] Maintenance Due   Topic Date Due    Hepatitis C Screening  Never done    COVID-19 Vaccine (1) Never done    HPV Age 9Y-34Y (1 - 2-dose series) Never done    Pap Smear  Never done    Flu Vaccine (1) Never done

## 2022-09-30 NOTE — PROGRESS NOTES
Herbert Cantrell (: 2000) is a 25 y.o. female, new patient, here for evaluation of the following chief complaint(s):  Establish Care (new patient )       ASSESSMENT/PLAN:  Below is the assessment and plan developed based on review of pertinent history, physical exam, labs, studies, and medications. 1. Hypothyroidism, unspecified type  -     VITAMIN D, 25 HYDROXY; Future  -     T4, FREE; Future  -     TSH 3RD GENERATION; Future  -     THYROID ANTIBODY PANEL; Future  2. Multiple thyroid nodules - will check thyroid US in 6 months  3. Mood swings - will start on depakote to help with moods. Hope to also get some help with migraines  -     divalproex ER (Depakote ER) 500 mg ER tablet; Take 1 Tablet by mouth nightly., Normal, Disp-90 Tablet, R-1  4. Fatigue, unspecified type  -     VITAMIN B12 & FOLATE; Future  5. Rectal bleeding  -     REFERRAL TO GASTROENTEROLOGY  6. Intractable migraine with aura with status migrainosus  -     divalproex ER (Depakote ER) 500 mg ER tablet; Take 1 Tablet by mouth nightly., Normal, Disp-90 Tablet, R-1  7. Need for hepatitis C screening test  -     HEPATITIS C AB; Future    Return in about 4 months (around 2023), or if symptoms worsen or fail to improve. SUBJECTIVE/OBJECTIVE:  HPI  patient comes in today to [facility], Jack Coley MD    She currently vapes. Took a mood stabilizer in past - states she took for 4 months, did not help  Some stress from boyfriend. States he suffers from mood swings, tends to blow up at everyone around him. Denies any physical abuse    Hx thyroid disease. Was diagnosed in 2022. Has family hx of thyroid disease in mother and both brothers  Thyroid ultrasound done in 2022:  FINDINGS:  The gland remains markedly, diffusely heterogeneous in echotexture and diffusely hypervascular. Right nodule is without significant change, measuring approximately 1.4 x 1.0 x 0.7 cm.  Previous subcentimeter left nodule is no longer discretely measurable. There is no new nodule. She is taking thyroid medication daily, just does not take at same time every day. May take in morning some days and in evenings other days. Some fatigue    Has had rectal bleeding for years. Usually BRB in stool. Unsure if has hemorrhoids. No Known Allergies    Past Medical History:   Diagnosis Date    Anemia     Thyroid disease        History reviewed. No pertinent surgical history. Social History     Socioeconomic History    Marital status: SINGLE     Spouse name: Not on file    Number of children: Not on file    Years of education: Not on file    Highest education level: Not on file   Occupational History    Not on file   Tobacco Use    Smoking status: Never    Smokeless tobacco: Never   Vaping Use    Vaping Use: Every day    Substances: Nicotine    Devices: Disposable   Substance and Sexual Activity    Alcohol use: No    Drug use: No    Sexual activity: Yes     Partners: Male     Birth control/protection: I.U.D. Other Topics Concern     Service Not Asked    Blood Transfusions Not Asked    Caffeine Concern Not Asked    Occupational Exposure Not Asked    Hobby Hazards Not Asked    Sleep Concern Not Asked    Stress Concern Not Asked    Weight Concern Not Asked    Special Diet Not Asked    Back Care Not Asked    Exercise Not Asked    Bike Helmet Not Asked    Seat Belt Not Asked    Self-Exams Not Asked   Social History Narrative    Not on file     Social Determinants of Health     Financial Resource Strain: Not on file   Food Insecurity: Not on file   Transportation Needs: Not on file   Physical Activity: Not on file   Stress: Not on file   Social Connections: Not on file   Intimate Partner Violence: Not on file   Housing Stability: Not on file       History reviewed. No pertinent family history. Current Outpatient Medications   Medication Sig    ubrogepant (UBRELVY) 100 mg tablet Take 100 mg by mouth once as needed for Migraine. levothyroxine (SYNTHROID) 25 mcg tablet Take 1.5 Tablets by mouth Daily (before breakfast). valACYclovir (VALTREX) 1 gram tablet Take 1 Tablet by mouth two (2) times a day. (Patient not taking: Reported on 9/30/2022)    triamcinolone acetonide (KENALOG) 0.1 % dental paste Apply thin layer to the affected area twice daily after breakfast and after diner (Patient not taking: Reported on 9/30/2022)    lidocaine (XYLOCAINE) 2 % solution TAKE 10 ml switch and spit 4 times daily before meals and at bedtime (Patient not taking: Reported on 9/30/2022)     No current facility-administered medications for this visit. Review of Systems   Constitutional:  Positive for fatigue. Negative for chills, diaphoresis, fever and unexpected weight change. Respiratory:  Negative for cough and shortness of breath. Cardiovascular:  Negative for chest pain, palpitations and leg swelling. Gastrointestinal:  Positive for anal bleeding and blood in stool. Negative for abdominal pain, constipation, diarrhea, nausea and vomiting. Endocrine: Negative for cold intolerance and heat intolerance. Genitourinary:  Negative for dysuria, flank pain, frequency, hematuria and urgency. Musculoskeletal:  Negative for back pain and myalgias. Skin: Negative. Neurological:  Positive for headaches (hx migraines). Negative for dizziness, speech difficulty, light-headedness and numbness. Psychiatric/Behavioral:  Positive for dysphoric mood. Negative for sleep disturbance. The patient is nervous/anxious. Vitals:    09/30/22 1445   BP: 113/73   Pulse: 61   Resp: 16   Temp: 98.2 °F (36.8 °C)   TempSrc: Oral   SpO2: 99%   Weight: 121 lb 6.4 oz (55.1 kg)   Height: 5' 3\" (1.6 m)     Physical Exam  Vitals reviewed. Constitutional:       Appearance: Normal appearance. She is well-developed and well-groomed. HENT:      Right Ear: Hearing normal.      Left Ear: Hearing normal.   Neck:      Thyroid: No thyromegaly.    Cardiovascular: Rate and Rhythm: Normal rate and regular rhythm. Pulses:           Dorsalis pedis pulses are 2+ on the right side and 2+ on the left side. Heart sounds: Normal heart sounds, S1 normal and S2 normal.   Pulmonary:      Effort: Pulmonary effort is normal.      Breath sounds: Normal breath sounds. Abdominal:      General: Bowel sounds are normal.      Palpations: Abdomen is soft. Tenderness: There is no abdominal tenderness. Musculoskeletal:      Right lower leg: No edema. Left lower leg: No edema. Lymphadenopathy:      Cervical: No cervical adenopathy. Skin:     General: Skin is warm and dry. Neurological:      Mental Status: She is alert and oriented to person, place, and time. Psychiatric:         Attention and Perception: Attention normal.         Mood and Affect: Mood and affect normal.         Speech: Speech normal.         Behavior: Behavior normal. Behavior is cooperative. Thought Content: Thought content normal.         Cognition and Memory: Cognition and memory normal.     On this date 09/30/2022 I have spent 30 minutes reviewing previous notes, test results and face to face with the patient discussing the diagnosis and importance of compliance with the treatment plan as well as documenting on the day of the visit. An electronic signature was used to authenticate this note.   -- Edel Liang NP

## 2022-10-01 LAB
25(OH)D3 SERPL-MCNC: 30.2 NG/ML (ref 30–100)
FOLATE SERPL-MCNC: 10 NG/ML (ref 5–21)
HCV AB SERPL QL IA: NONREACTIVE
T4 FREE SERPL-MCNC: 1 NG/DL (ref 0.8–1.5)
TSH SERPL DL<=0.05 MIU/L-ACNC: 8.49 UIU/ML (ref 0.36–3.74)
VIT B12 SERPL-MCNC: 372 PG/ML (ref 193–986)

## 2022-10-03 PROBLEM — E03.9 HYPOTHYROIDISM: Status: ACTIVE | Noted: 2022-10-03

## 2022-10-03 LAB
THYROGLOB AB SERPL-ACNC: 297.4 IU/ML (ref 0–0.9)
THYROPEROXIDASE AB SERPL-ACNC: 310 IU/ML (ref 0–34)

## 2022-10-03 RX ORDER — LEVOTHYROXINE SODIUM 50 UG/1
50 TABLET ORAL
Qty: 90 TABLET | Refills: 1 | Status: SHIPPED | OUTPATIENT
Start: 2022-10-03

## 2022-10-03 NOTE — PROGRESS NOTES
Thyroid antibody panel high, which indicates Hashimoto's thyroiditis. Hashimoto's thyroiditis a common cause of goiter (enlarged thyroid) formation. With Hashimoto's there is destruction of the thyroid gland by one's own immune system. As the gland becomes more damaged, it is less able to make adequate supplies of thyroid hormone. The pituitary gland senses a low thyroid hormone level and secretes more TSH to stimulate the thyroid. This stimulation causes the thyroid to grow, which may produce a goiter. If you develop compressive symptoms (difficulty breathing (especially when lying flat), food or pills getting \"stuck\" in the throat, choking sensation, or a fullness in the neck, we can reevaluate an ultrasound and refer you out to consider removal.     TSH is still elevated. I will update your thyroid prescription to 50mcg. You can take 2 tablets of thyroid medication (levothyroxine 25mcg) until you run out, but will also send new prescription for a 50mcg tablet. Then with new prescription, you will only have to take 1 tablet daily. Some important things to take note of when taking thyroid medications. There is more than 1  of this drug, and they are not all created equal.  Make sure your pharmacy is consistent with the  (color, shape, and size of pill is not changing from month to month.)   Take on an empty stomach 30 minutes before breakfast.   Do not take iron products, antacids that have aluminum and magnesium, calcium carbonate, simethicone, sucralfate, Kayexalate, colestipol, or cholestyramine within 4 hours of this drug. Some foods like soybean and fiber may change how this drug works in your body. Take only with a glass of water in the morning before you eat. Be sure to take your levothyroxine every morning and if you do miss a morning you can take two pills the next morning to catch up.  Levothyroxine is one of the few pills that you can double up on the next day if you miss one. Please watch out for any symptoms of hyperthyroidism that would suggest your dose is too much such as chest pain, heart racing, anxiety, tremors, trouble sleeping, feeling hot all the time, losing weight with out trying, hair loss, or diarrhea. If you have any of these symptoms, please contact the office.

## 2022-12-12 DIAGNOSIS — E03.9 HYPOTHYROIDISM, UNSPECIFIED TYPE: ICD-10-CM

## 2022-12-13 RX ORDER — LEVOTHYROXINE SODIUM 50 UG/1
50 TABLET ORAL
Qty: 90 TABLET | Refills: 1 | OUTPATIENT
Start: 2022-12-13

## 2022-12-13 NOTE — TELEPHONE ENCOUNTER
Synthroid was sent on 10/3/22 for #90 with 1 refill      For [address] in place:   Recommendation Provided To:    Intervention Detail: New Rx: 1, reason: Patient Preference  Gap Closed?:   Intervention Accepted By:   Time Spent (min): 5

## 2022-12-18 DIAGNOSIS — E04.2 NONTOXIC MULTINODULAR GOITER: ICD-10-CM

## 2022-12-18 DIAGNOSIS — E03.9 HYPOTHYROIDISM, UNSPECIFIED TYPE: ICD-10-CM

## 2022-12-19 RX ORDER — LEVOTHYROXINE SODIUM 25 UG/1
TABLET ORAL
Qty: 45 TABLET | Refills: 3 | Status: SHIPPED | OUTPATIENT
Start: 2022-12-19

## 2022-12-19 RX ORDER — UBROGEPANT 100 MG/1
TABLET ORAL
Qty: 16 TABLET | Refills: 4 | Status: SHIPPED | OUTPATIENT
Start: 2022-12-19

## 2022-12-20 ENCOUNTER — DOCUMENTATION ONLY (OUTPATIENT)
Dept: FAMILY MEDICINE CLINIC | Age: 22
End: 2022-12-20

## 2023-02-14 ENCOUNTER — OFFICE VISIT (OUTPATIENT)
Dept: FAMILY MEDICINE CLINIC | Age: 23
End: 2023-02-14

## 2023-02-14 VITALS
HEIGHT: 63 IN | BODY MASS INDEX: 20.41 KG/M2 | WEIGHT: 115.2 LBS | DIASTOLIC BLOOD PRESSURE: 62 MMHG | TEMPERATURE: 96.9 F | SYSTOLIC BLOOD PRESSURE: 112 MMHG | RESPIRATION RATE: 12 BRPM | HEART RATE: 72 BPM | OXYGEN SATURATION: 100 %

## 2023-02-14 DIAGNOSIS — G43.009 MIGRAINE WITHOUT AURA AND WITHOUT STATUS MIGRAINOSUS, NOT INTRACTABLE: ICD-10-CM

## 2023-02-14 DIAGNOSIS — E03.9 HYPOTHYROIDISM, UNSPECIFIED TYPE: Primary | ICD-10-CM

## 2023-02-14 DIAGNOSIS — M54.50 CHRONIC MIDLINE LOW BACK PAIN WITHOUT SCIATICA: ICD-10-CM

## 2023-02-14 DIAGNOSIS — G89.29 CHRONIC MIDLINE THORACIC BACK PAIN: ICD-10-CM

## 2023-02-14 DIAGNOSIS — M54.6 CHRONIC MIDLINE THORACIC BACK PAIN: ICD-10-CM

## 2023-02-14 DIAGNOSIS — G89.29 CHRONIC MIDLINE LOW BACK PAIN WITHOUT SCIATICA: ICD-10-CM

## 2023-02-14 DIAGNOSIS — E04.2 NONTOXIC MULTINODULAR GOITER: ICD-10-CM

## 2023-02-14 DIAGNOSIS — R45.86 MOOD SWINGS: ICD-10-CM

## 2023-02-14 PROCEDURE — 99214 OFFICE O/P EST MOD 30 MIN: CPT | Performed by: NURSE PRACTITIONER

## 2023-02-14 RX ORDER — NAPROXEN 500 MG/1
500 TABLET ORAL 2 TIMES DAILY WITH MEALS
Qty: 40 TABLET | Refills: 1 | Status: SHIPPED | OUTPATIENT
Start: 2023-02-14

## 2023-02-14 RX ORDER — VENLAFAXINE HYDROCHLORIDE 37.5 MG/1
37.5 CAPSULE, EXTENDED RELEASE ORAL DAILY
Qty: 30 CAPSULE | Refills: 1 | Status: SHIPPED | OUTPATIENT
Start: 2023-02-14

## 2023-02-14 RX ORDER — DIVALPROEX SODIUM 250 MG/1
250 TABLET, EXTENDED RELEASE ORAL
Qty: 90 TABLET | Refills: 1 | Status: SHIPPED | OUTPATIENT
Start: 2023-02-14

## 2023-02-14 NOTE — PROGRESS NOTES
Negra Painting (: 2000) is a 25 y.o. female, established patient, here for evaluation of the following chief complaint(s):  Thyroid Problem       ASSESSMENT/PLAN:  Below is the assessment and plan developed based on review of pertinent history, physical exam, labs, studies, and medications. 1. Hypothyroidism, unspecified type  -     T4, FREE; Future  -     TSH 3RD GENERATION; Future  2. Nontoxic multinodular goiter  -     T4, FREE; Future  -     TSH 3RD GENERATION; Future  3. Mood swings  -     divalproex ER (DEPAKOTE ER) 250 mg ER tablet; Take 1 Tablet by mouth nightly., Normal, Disp-90 Tablet, R-1  -     venlafaxine-SR (EFFEXOR-XR) 37.5 mg capsule; Take 1 Capsule by mouth daily. , Normal, Disp-30 Capsule, R-1  4. Migraine without aura and without status migrainosus, not intractable  -     divalproex ER (DEPAKOTE ER) 250 mg ER tablet; Take 1 Tablet by mouth nightly., Normal, Disp-90 Tablet, R-1  -     rimegepant (NURTEC) 75 mg disintegrating tablet; Take 1 Tablet by mouth once as needed for Migraine for up to 1 dose. No more than 1 dose in 24 hours, Normal, Disp-16 Tablet, R-2  -     venlafaxine-SR (EFFEXOR-XR) 37.5 mg capsule; Take 1 Capsule by mouth daily. , Normal, Disp-30 Capsule, R-1  5. Chronic midline low back pain without sciatica  -     naproxen (NAPROSYN) 500 mg tablet; Take 1 Tablet by mouth two (2) times daily (with meals). As needed for back pain, Normal, Disp-40 Tablet, R-1  -     XR SPINE LUMB 2 OR 3 V; Future  6. Chronic midline thoracic back pain  -     naproxen (NAPROSYN) 500 mg tablet; Take 1 Tablet by mouth two (2) times daily (with meals). As needed for back pain, Normal, Disp-40 Tablet, R-1  -     XR SPINE THORAC 2 V; Future      Return in about 4 months (around 2023). SUBJECTIVE/OBJECTIVE:  HPI   Pt is here for 4 month follow up today. Would like to discuss back pain, and dosage for Depakote. Hx hypothyroidism, multinodular thyroid. Was diagnosed in 2022.   Has family hx of thyroid disease in mother and both brothers  Thyroid ultrasound done in June 2022:  FINDINGS:  The gland remains markedly, diffusely heterogeneous in echotexture and diffusely hypervascular. Right nodule is without significant change, measuring approximately 1.4 x 1.0 x 0.7 cm. Previous subcentimeter left nodule is no longer discretely measurable. There is no new nodule. TSH was elevated at 8.49 on 09/30/2022, Thyroid antibody panel high, indicating Hashimoto's thyroiditis. She had 2 previous elevated TSH levels in Feb and June 2022 while under care of previous PCP. Was initially started on LT4 25mcg and was increased to 50mcg in Sept 2022. Has been taking LT4 in evening without regard to food. Feels like depakote helps her sit back and keep from having explosive mood swings. She is also using for migraines prevention, but does not seem to help migraines. States Depakote tends to make her sleepy the next day. Stone Cassiusider does not help migraine once the headache is already present. If she takes it before migraines, it will keep her from getting one. Having migraines 3-4 times weekly. Also has photophobia, phonophobia, nausea. Wearing hard hat  daily at work on her head makes her head feel like it is going to explode. Increased stress does not help. Has tried imitrex, ubrelvy. Depakote, quilipta    Some stress from boyfriend. States he suffers from mood swings, tends to blow up at everyone around him and takes things out on her. She left him 2 days ago, hoping he will get thehelp he needs. States she is sad about separation, but also a little relieved. They have a 3yo son Ynae Fernandez together. Denies any physical abuse. No Known Allergies    Past Medical History:   Diagnosis Date    Anemia     Hypothyroidism 10/3/2022    Thyroid disease        History reviewed. No pertinent surgical history.     Social History     Socioeconomic History    Marital status: SINGLE     Spouse name: Not on file Number of children: Not on file    Years of education: Not on file    Highest education level: Not on file   Occupational History    Not on file   Tobacco Use    Smoking status: Never    Smokeless tobacco: Never   Vaping Use    Vaping Use: Every day    Substances: Nicotine    Devices: Disposable   Substance and Sexual Activity    Alcohol use: No    Drug use: No    Sexual activity: Yes     Partners: Male     Birth control/protection: I.U.D. Other Topics Concern     Service Not Asked    Blood Transfusions Not Asked    Caffeine Concern Not Asked    Occupational Exposure Not Asked    Hobby Hazards Not Asked    Sleep Concern Not Asked    Stress Concern Not Asked    Weight Concern Not Asked    Special Diet Not Asked    Back Care Not Asked    Exercise Not Asked    Bike Helmet Not Asked    Seat Belt Not Asked    Self-Exams Not Asked   Social History Narrative    1yo son Florencio Luevano. Works at [employer]. Social Determinants of Health     Financial Resource Strain: Not on file   Food Insecurity: Not on file   Transportation Needs: Not on file   Physical Activity: Not on file   Stress: Not on file   Social Connections: Not on file   Intimate Partner Violence: Not on file   Housing Stability: Not on file       Family History   Problem Relation Age of Onset    Thyroid Disease Mother     Stroke Mother     No Known Problems Father     Thyroid Disease Brother     Thyroid Disease Brother        Current Outpatient Medications   Medication Sig    Ubrelvy 100 mg tablet TAKE 1 TABLET BY MOUTH ONCE AS NEEDED FOR MIGRAINE FOR UP TO 1 DOSE. TAKING 2 50 MG TABLETS, SO A 100 MG    levothyroxine (SYNTHROID) 50 mcg tablet Take 1 Tablet by mouth Daily (before breakfast). divalproex ER (Depakote ER) 500 mg ER tablet Take 1 Tablet by mouth nightly.     levothyroxine (SYNTHROID) 25 mcg tablet TAKE 1 1/2 TABLETS BY MOUTH ONCE DAILY BEFORE BREAKFAST (Patient not taking: Reported on 2/14/2023)     No current facility-administered medications for this visit. Review of Systems   Constitutional:  Negative for chills, diaphoresis, fever and unexpected weight change. Respiratory:  Negative for cough and shortness of breath. Cardiovascular:  Negative for chest pain, palpitations and leg swelling. Gastrointestinal:  Negative for abdominal pain, diarrhea, nausea and vomiting. Endocrine: Negative for cold intolerance and heat intolerance. Genitourinary:  Negative for dysuria, flank pain, frequency, hematuria and urgency. Musculoskeletal:  Positive for back pain. Negative for myalgias. Skin: Negative. Neurological:  Positive for headaches (hx migraines). Negative for dizziness, speech difficulty, light-headedness and numbness. Psychiatric/Behavioral:  Positive for dysphoric mood. Negative for sleep disturbance. The patient is nervous/anxious. Vitals:    02/14/23 1410   BP: 112/62   Pulse: 72   Resp: 12   Temp: 96.9 °F (36.1 °C)   TempSrc: Oral   SpO2: 100%   Weight: 115 lb 3.2 oz (52.3 kg)   Height: 5' 3\" (1.6 m)     Physical Exam  Vitals reviewed. Constitutional:       Appearance: Normal appearance. She is well-developed and well-groomed. HENT:      Right Ear: Hearing normal.      Left Ear: Hearing normal.   Neck:      Thyroid: No thyromegaly. Cardiovascular:      Rate and Rhythm: Normal rate and regular rhythm. Heart sounds: Normal heart sounds. Pulmonary:      Effort: Pulmonary effort is normal.      Breath sounds: Normal breath sounds. Musculoskeletal:      Thoracic back: Bony tenderness present. No swelling, deformity, spasms or tenderness. Normal range of motion. No scoliosis. Lumbar back: Bony tenderness present. No swelling, deformity, spasms or tenderness. Normal range of motion. Negative right straight leg raise test and negative left straight leg raise test. No scoliosis. Lymphadenopathy:      Cervical: No cervical adenopathy. Skin:     General: Skin is warm and dry.    Neurological: Mental Status: She is alert and oriented to person, place, and time. Psychiatric:         Attention and Perception: Attention normal.         Mood and Affect: Mood and affect normal.         Speech: Speech normal.         Behavior: Behavior normal. Behavior is cooperative. Thought Content: Thought content normal.         Cognition and Memory: Cognition and memory normal.     On this date 02/14/2023 I have spent 30 minutes reviewing previous notes, test results and face to face with the patient discussing the diagnosis and importance of compliance with the treatment plan as well as documenting on the day of the visit. An electronic signature was used to authenticate this note.   -- Dimitry Mcintosh NP

## 2023-02-14 NOTE — PROGRESS NOTES
Chief Complaint   Patient presents with    Thyroid Problem       1. \"Have you been to the ER, urgent care clinic since your last visit? Hospitalized since your last visit? \" No    2. \"Have you seen or consulted any other health care providers outside of the [address] [place] [address] since your last visit? \" No     3. For patients aged 39-70: Has the patient had a colonoscopy / FIT/ Cologuard? NA - based on age      If the patient is female:    4. For patients aged 41-77: Has the patient had a mammogram within the past 2 years? NA - based on age or sex      11. For patients aged 21-65: Has the patient had a pap smear? No    Pt is here for 4 month follow up today. Would like to discuss back pain, and dosage for Depakote.      Health Maintenance Due   Topic Date Due    HPV Age 9Y-34Y (1 - 2-dose series) Never done    Pap Smear  Never done

## 2023-02-15 ENCOUNTER — TELEPHONE (OUTPATIENT)
Dept: FAMILY MEDICINE CLINIC | Age: 23
End: 2023-02-15

## 2023-02-15 ENCOUNTER — DOCUMENTATION ONLY (OUTPATIENT)
Dept: FAMILY MEDICINE CLINIC | Age: 23
End: 2023-02-15

## 2023-02-15 LAB
T4 FREE SERPL-MCNC: 1 NG/DL (ref 0.8–1.5)
TSH SERPL DL<=0.05 MIU/L-ACNC: 4.12 UIU/ML (ref 0.36–3.74)

## 2023-02-15 NOTE — TELEPHONE ENCOUNTER
----- Message from Franck Carlson sent at 2/15/2023 12:31 PM EST -----  Subject: Message to Provider    QUESTIONS  Information for Provider? Patient wants her doctor to know that she has   severe back pain for about a month. She says the Nurtec is not working and   would like something different. Please call as soon as possible. Thanks.  ---------------------------------------------------------------------------  --------------  Adama GUARDADO  [phone]; OK to leave message on voicemail  ---------------------------------------------------------------------------  --------------  SCRIPT ANSWERS  Relationship to Patient?  Self

## 2023-02-15 NOTE — TELEPHONE ENCOUNTER
Verified patient with two type of identifiers. Spoke to pt - she took the Naproxen (not Nurtec) yesterday for her back pain and it did not help her. The pain is worse than it was yesterday. She is asking if there is anything stronger that can be sent for her.  Please advise

## 2023-02-15 NOTE — TELEPHONE ENCOUNTER
Patient can take the Naprosyn with Tylenol ES 2 tablets. Make sure she is working on back stretches in her AVS.  Continue with medication and exercises for couple weeks.   If she continues with back pain, can refer to outpatient PT and ortho

## 2023-02-16 NOTE — PROGRESS NOTES
Thyroid lab (TSH) is almost near normal.  Take your thyroid medication every morning on an empty stomach with a glass of water. Do not eat for 30-60 minutes after taking. We can repeat in 4 months.

## 2023-02-22 ENCOUNTER — DOCUMENTATION ONLY (OUTPATIENT)
Dept: FAMILY MEDICINE CLINIC | Age: 23
End: 2023-02-22

## 2023-02-22 NOTE — PROGRESS NOTES
Submitted PA for [facility] on covermymeds. Waiting on determination.

## 2023-02-23 ENCOUNTER — DOCUMENTATION ONLY (OUTPATIENT)
Dept: FAMILY MEDICINE CLINIC | Age: 23
End: 2023-02-23

## 2023-04-04 ENCOUNTER — TELEPHONE (OUTPATIENT)
Dept: FAMILY MEDICINE CLINIC | Age: 23
End: 2023-04-04

## 2023-04-20 ENCOUNTER — TELEPHONE (OUTPATIENT)
Dept: FAMILY MEDICINE CLINIC | Age: 23
End: 2023-04-20

## 2023-04-20 NOTE — TELEPHONE ENCOUNTER
----- Message from Dino Joya NP sent at 4/17/2023  5:03 PM EDT -----  Your TSH is elevated. I really need you to try and take your medication in the morning with only water on an empty stomach and wait 30 minutes before eating. This is the only way we will be able to get an accurate representation of how the medicine is working for you. Blood counts normal.  Diabetes screening negative.   Liver and kidney function normal

## 2023-05-25 ENCOUNTER — CLINICAL DOCUMENTATION (OUTPATIENT)
Age: 23
End: 2023-05-25

## 2023-07-22 DIAGNOSIS — G43.009 MIGRAINE WITHOUT AURA, NOT INTRACTABLE, WITHOUT STATUS MIGRAINOSUS: ICD-10-CM

## 2023-07-24 RX ORDER — RIMEGEPANT SULFATE 75 MG/75MG
TABLET, ORALLY DISINTEGRATING ORAL
Qty: 8 TABLET | Refills: 1 | Status: SHIPPED | OUTPATIENT
Start: 2023-07-24

## 2023-07-27 ENCOUNTER — TELEPHONE (OUTPATIENT)
Age: 23
End: 2023-07-27

## 2023-07-27 NOTE — TELEPHONE ENCOUNTER
Patient wants to get a note for work for today 07/27/2023 due to a cough and congestion she has.   Please give her a call @ [phone]

## 2023-07-28 ENCOUNTER — PATIENT MESSAGE (OUTPATIENT)
Age: 23
End: 2023-07-28

## 2023-10-02 ENCOUNTER — TELEMEDICINE (OUTPATIENT)
Age: 23
End: 2023-10-02

## 2023-10-02 DIAGNOSIS — G43.009 MIGRAINE WITHOUT AURA AND WITHOUT STATUS MIGRAINOSUS, NOT INTRACTABLE: ICD-10-CM

## 2023-10-02 DIAGNOSIS — E04.2 NONTOXIC MULTINODULAR GOITER: ICD-10-CM

## 2023-10-02 DIAGNOSIS — E03.9 ACQUIRED HYPOTHYROIDISM: Primary | ICD-10-CM

## 2023-10-02 PROCEDURE — 99214 OFFICE O/P EST MOD 30 MIN: CPT | Performed by: NURSE PRACTITIONER

## 2023-10-02 RX ORDER — MEDROXYPROGESTERONE ACETATE 150 MG/ML
150 INJECTION, SUSPENSION INTRAMUSCULAR
COMMUNITY

## 2023-10-02 SDOH — ECONOMIC STABILITY: FOOD INSECURITY: WITHIN THE PAST 12 MONTHS, THE FOOD YOU BOUGHT JUST DIDN'T LAST AND YOU DIDN'T HAVE MONEY TO GET MORE.: NEVER TRUE

## 2023-10-02 SDOH — ECONOMIC STABILITY: INCOME INSECURITY: HOW HARD IS IT FOR YOU TO PAY FOR THE VERY BASICS LIKE FOOD, HOUSING, MEDICAL CARE, AND HEATING?: NOT HARD AT ALL

## 2023-10-02 SDOH — ECONOMIC STABILITY: HOUSING INSECURITY
IN THE LAST 12 MONTHS, WAS THERE A TIME WHEN YOU DID NOT HAVE A STEADY PLACE TO SLEEP OR SLEPT IN A SHELTER (INCLUDING NOW)?: NO

## 2023-10-02 SDOH — ECONOMIC STABILITY: FOOD INSECURITY: WITHIN THE PAST 12 MONTHS, YOU WORRIED THAT YOUR FOOD WOULD RUN OUT BEFORE YOU GOT MONEY TO BUY MORE.: NEVER TRUE

## 2023-10-02 ASSESSMENT — ENCOUNTER SYMPTOMS
VOMITING: 0
CONSTIPATION: 0
DIARRHEA: 0
SHORTNESS OF BREATH: 0
ABDOMINAL PAIN: 0
COUGH: 0
NAUSEA: 0
BLOOD IN STOOL: 0

## 2023-10-02 NOTE — PROGRESS NOTES
Chief Complaint   Patient presents with    Follow-up       1. Have you been to the ER, urgent care clinic since your last visit? Hospitalized since your last visit? No    2. Have you seen or consulted any other health care providers outside of the [address] [place] [address] since your last visit? Include any pap smears or colon screening.  No    The patient, Liyah Kamara, identity was verified by  Name and

## 2023-10-02 NOTE — PROGRESS NOTES
Jayson Graff (:  2000) is a Established patient, presenting virtually for evaluation of the following:    Assessment & Plan   Below is the assessment and plan developed based on review of pertinent history, physical exam, labs, studies, and medications. 1. Acquired hypothyroidism - patient was off levothyroxine for few months due to lack of insurance. Patient advised to call office if occurs in the future, she can get medication through [facility] under generic cost ($10 for 90d supply). Will check labs in couple weeks. -     TSH; Future  -     T4, Free; Future  2. Nontoxic multinodular goiter - last thyroid US in 2022:  The gland remains markedly, diffusely heterogeneous in echotexture and diffusely hypervascular. Right nodule is without significant change, measuring approximately 1.4 x 1.0 x 0.7 cm. Previous subcentimeter left nodule is no longer discretely measurable. There is no new nodule. 3. Migraine without aura and without status migrainosus, not intractable - stable. Continue Nurtec for abortive treatment. If increase in migraines, can convert Nurtec to QOD dosing for preventative treatment. No follow-ups on file. Subjective   HPI  patient is seen virtually for follow up. She is working a new job, went a while without insurance. 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 States things are going well at home, her and Rosemary Parkinson are getting  in November. 5yo son Logan    Weight up to 122#. Appetite good. States this is a good weight for her. Hx hypothyroidism, multinodular thyroid. States she was off levothyroxine for few months due to lack of insurance. She has been back on medication for 6 weeks now. Last TSH 8.36 in 2023 - patient encouraged to take med in AM with only water on empty stomach to get accurate representation of how medication is working. TSH 4.2023 - no change, encouraged to take on empty stomach.   TSH

## 2023-11-29 ENCOUNTER — HOSPITAL ENCOUNTER (INPATIENT)
Facility: HOSPITAL | Age: 23
LOS: 2 days | Discharge: HOME HEALTH | End: 2023-12-01
Attending: STUDENT IN AN ORGANIZED HEALTH CARE EDUCATION/TRAINING PROGRAM | Admitting: INTERNAL MEDICINE
Payer: COMMERCIAL

## 2023-11-29 ENCOUNTER — APPOINTMENT (OUTPATIENT)
Facility: HOSPITAL | Age: 23
End: 2023-11-29
Payer: COMMERCIAL

## 2023-11-29 DIAGNOSIS — I63.9 CEREBROVASCULAR ACCIDENT (CVA), UNSPECIFIED MECHANISM (HCC): ICD-10-CM

## 2023-11-29 DIAGNOSIS — I63.9 ISCHEMIC STROKE (HCC): ICD-10-CM

## 2023-11-29 LAB
ALBUMIN SERPL-MCNC: 4.2 G/DL (ref 3.5–5)
ALBUMIN/GLOB SERPL: 1.2 (ref 1.1–2.2)
ALP SERPL-CCNC: 54 U/L (ref 45–117)
ALT SERPL-CCNC: 21 U/L (ref 12–78)
ANION GAP SERPL CALC-SCNC: 6 MMOL/L (ref 5–15)
APPEARANCE UR: CLEAR
AST SERPL-CCNC: 14 U/L (ref 15–37)
BACTERIA URNS QL MICRO: NEGATIVE /HPF
BASOPHILS # BLD: 0.1 K/UL (ref 0–0.1)
BASOPHILS NFR BLD: 1 % (ref 0–1)
BILIRUB SERPL-MCNC: 0.4 MG/DL (ref 0.2–1)
BILIRUB UR QL: NEGATIVE
BUN SERPL-MCNC: 15 MG/DL (ref 6–20)
BUN/CREAT SERPL: 19 (ref 12–20)
CALCIUM SERPL-MCNC: 9.5 MG/DL (ref 8.5–10.1)
CHLORIDE SERPL-SCNC: 108 MMOL/L (ref 97–108)
CO2 SERPL-SCNC: 27 MMOL/L (ref 21–32)
COLOR UR: ABNORMAL
CREAT SERPL-MCNC: 0.81 MG/DL (ref 0.55–1.02)
D DIMER PPP FEU-MCNC: 0.46 MG/L FEU (ref 0–0.65)
DIFFERENTIAL METHOD BLD: ABNORMAL
EOSINOPHIL # BLD: 0.1 K/UL (ref 0–0.4)
EOSINOPHIL NFR BLD: 1 % (ref 0–7)
EPITH CASTS URNS QL MICRO: ABNORMAL /LPF
ERYTHROCYTE [DISTWIDTH] IN BLOOD BY AUTOMATED COUNT: 13.4 % (ref 11.5–14.5)
GLOBULIN SER CALC-MCNC: 3.6 G/DL (ref 2–4)
GLUCOSE BLD STRIP.AUTO-MCNC: 98 MG/DL (ref 65–117)
GLUCOSE SERPL-MCNC: 99 MG/DL (ref 65–100)
GLUCOSE UR STRIP.AUTO-MCNC: NEGATIVE MG/DL
HCG UR QL: NEGATIVE
HCT VFR BLD AUTO: 41.9 % (ref 35–47)
HGB BLD-MCNC: 13.9 G/DL (ref 11.5–16)
HGB UR QL STRIP: NEGATIVE
HYALINE CASTS URNS QL MICRO: ABNORMAL /LPF (ref 0–2)
IMM GRANULOCYTES # BLD AUTO: 0 K/UL (ref 0–0.04)
IMM GRANULOCYTES NFR BLD AUTO: 1 % (ref 0–0.5)
INR PPP: 1.1 (ref 0.9–1.1)
KETONES UR QL STRIP.AUTO: NEGATIVE MG/DL
LEUKOCYTE ESTERASE UR QL STRIP.AUTO: NEGATIVE
LYMPHOCYTES # BLD: 1.8 K/UL (ref 0.8–3.5)
LYMPHOCYTES NFR BLD: 20 % (ref 12–49)
MCH RBC QN AUTO: 28.1 PG (ref 26–34)
MCHC RBC AUTO-ENTMCNC: 33.2 G/DL (ref 30–36.5)
MCV RBC AUTO: 84.8 FL (ref 80–99)
MONOCYTES # BLD: 0.4 K/UL (ref 0–1)
MONOCYTES NFR BLD: 5 % (ref 5–13)
NEUTS SEG # BLD: 6.3 K/UL (ref 1.8–8)
NEUTS SEG NFR BLD: 72 % (ref 32–75)
NITRITE UR QL STRIP.AUTO: NEGATIVE
NRBC # BLD: 0 K/UL (ref 0–0.01)
NRBC BLD-RTO: 0 PER 100 WBC
PH UR STRIP: 5.5 (ref 5–8)
PLATELET # BLD AUTO: 394 K/UL (ref 150–400)
PMV BLD AUTO: 9.9 FL (ref 8.9–12.9)
POTASSIUM SERPL-SCNC: 4 MMOL/L (ref 3.5–5.1)
PROT SERPL-MCNC: 7.8 G/DL (ref 6.4–8.2)
PROT UR STRIP-MCNC: NEGATIVE MG/DL
PROTHROMBIN TIME: 11.1 SEC (ref 9–11.1)
RBC # BLD AUTO: 4.94 M/UL (ref 3.8–5.2)
RBC #/AREA URNS HPF: ABNORMAL /HPF (ref 0–5)
SERVICE CMNT-IMP: NORMAL
SODIUM SERPL-SCNC: 141 MMOL/L (ref 136–145)
SP GR UR REFRACTOMETRY: 1.01
T4 FREE SERPL-MCNC: 0.7 NG/DL (ref 0.8–1.5)
TROPONIN I SERPL HS-MCNC: 5 NG/L (ref 0–51)
TSH SERPL DL<=0.05 MIU/L-ACNC: 26.8 UIU/ML (ref 0.36–3.74)
UROBILINOGEN UR QL STRIP.AUTO: 0.2 EU/DL (ref 0.2–1)
WBC # BLD AUTO: 8.6 K/UL (ref 3.6–11)
WBC URNS QL MICRO: ABNORMAL /HPF (ref 0–4)

## 2023-11-29 PROCEDURE — 84439 ASSAY OF FREE THYROXINE: CPT

## 2023-11-29 PROCEDURE — 96361 HYDRATE IV INFUSION ADD-ON: CPT

## 2023-11-29 PROCEDURE — 2580000003 HC RX 258: Performed by: STUDENT IN AN ORGANIZED HEALTH CARE EDUCATION/TRAINING PROGRAM

## 2023-11-29 PROCEDURE — 6360000002 HC RX W HCPCS

## 2023-11-29 PROCEDURE — 0042T CT BRAIN PERFUSION: CPT

## 2023-11-29 PROCEDURE — 85730 THROMBOPLASTIN TIME PARTIAL: CPT

## 2023-11-29 PROCEDURE — 6360000004 HC RX CONTRAST MEDICATION: Performed by: STUDENT IN AN ORGANIZED HEALTH CARE EDUCATION/TRAINING PROGRAM

## 2023-11-29 PROCEDURE — 84484 ASSAY OF TROPONIN QUANT: CPT

## 2023-11-29 PROCEDURE — 6370000000 HC RX 637 (ALT 250 FOR IP): Performed by: INTERNAL MEDICINE

## 2023-11-29 PROCEDURE — 85613 RUSSELL VIPER VENOM DILUTED: CPT

## 2023-11-29 PROCEDURE — 85597 PHOSPHOLIPID PLTLT NEUTRALIZ: CPT

## 2023-11-29 PROCEDURE — 85379 FIBRIN DEGRADATION QUANT: CPT

## 2023-11-29 PROCEDURE — 71045 X-RAY EXAM CHEST 1 VIEW: CPT

## 2023-11-29 PROCEDURE — 82962 GLUCOSE BLOOD TEST: CPT

## 2023-11-29 PROCEDURE — 92977 HC THROMBOLYSIS/CORONARY: CPT

## 2023-11-29 PROCEDURE — 86146 BETA-2 GLYCOPROTEIN ANTIBODY: CPT

## 2023-11-29 PROCEDURE — 6370000000 HC RX 637 (ALT 250 FOR IP): Performed by: NURSE PRACTITIONER

## 2023-11-29 PROCEDURE — 86148 ANTI-PHOSPHOLIPID ANTIBODY: CPT

## 2023-11-29 PROCEDURE — 85610 PROTHROMBIN TIME: CPT

## 2023-11-29 PROCEDURE — 81001 URINALYSIS AUTO W/SCOPE: CPT

## 2023-11-29 PROCEDURE — 6360000002 HC RX W HCPCS: Performed by: STUDENT IN AN ORGANIZED HEALTH CARE EDUCATION/TRAINING PROGRAM

## 2023-11-29 PROCEDURE — 85025 COMPLETE CBC W/AUTO DIFF WBC: CPT

## 2023-11-29 PROCEDURE — 70498 CT ANGIOGRAPHY NECK: CPT

## 2023-11-29 PROCEDURE — 96366 THER/PROPH/DIAG IV INF ADDON: CPT

## 2023-11-29 PROCEDURE — 2580000003 HC RX 258: Performed by: INTERNAL MEDICINE

## 2023-11-29 PROCEDURE — 84443 ASSAY THYROID STIM HORMONE: CPT

## 2023-11-29 PROCEDURE — 81025 URINE PREGNANCY TEST: CPT

## 2023-11-29 PROCEDURE — 2000000000 HC ICU R&B

## 2023-11-29 PROCEDURE — 70553 MRI BRAIN STEM W/O & W/DYE: CPT

## 2023-11-29 PROCEDURE — 6360000004 HC RX CONTRAST MEDICATION: Performed by: INTERNAL MEDICINE

## 2023-11-29 PROCEDURE — 99285 EMERGENCY DEPT VISIT HI MDM: CPT

## 2023-11-29 PROCEDURE — 93005 ELECTROCARDIOGRAM TRACING: CPT | Performed by: STUDENT IN AN ORGANIZED HEALTH CARE EDUCATION/TRAINING PROGRAM

## 2023-11-29 PROCEDURE — 96360 HYDRATION IV INFUSION INIT: CPT

## 2023-11-29 PROCEDURE — A9579 GAD-BASE MR CONTRAST NOS,1ML: HCPCS | Performed by: INTERNAL MEDICINE

## 2023-11-29 PROCEDURE — 86147 CARDIOLIPIN ANTIBODY EA IG: CPT

## 2023-11-29 PROCEDURE — 36415 COLL VENOUS BLD VENIPUNCTURE: CPT

## 2023-11-29 PROCEDURE — 96365 THER/PROPH/DIAG IV INF INIT: CPT

## 2023-11-29 PROCEDURE — 70450 CT HEAD/BRAIN W/O DYE: CPT

## 2023-11-29 PROCEDURE — 80053 COMPREHEN METABOLIC PANEL: CPT

## 2023-11-29 RX ORDER — ONDANSETRON 4 MG/1
4 TABLET, ORALLY DISINTEGRATING ORAL EVERY 8 HOURS PRN
Status: DISCONTINUED | OUTPATIENT
Start: 2023-11-29 | End: 2023-12-01 | Stop reason: HOSPADM

## 2023-11-29 RX ORDER — POLYETHYLENE GLYCOL 3350 17 G/17G
17 POWDER, FOR SOLUTION ORAL DAILY PRN
Status: DISCONTINUED | OUTPATIENT
Start: 2023-11-29 | End: 2023-12-01 | Stop reason: HOSPADM

## 2023-11-29 RX ORDER — 0.9 % SODIUM CHLORIDE 0.9 %
1000 INTRAVENOUS SOLUTION INTRAVENOUS ONCE
Status: COMPLETED | OUTPATIENT
Start: 2023-11-29 | End: 2023-11-29

## 2023-11-29 RX ORDER — ACETAMINOPHEN 325 MG/1
650 TABLET ORAL EVERY 6 HOURS PRN
Status: DISCONTINUED | OUTPATIENT
Start: 2023-11-29 | End: 2023-12-01 | Stop reason: HOSPADM

## 2023-11-29 RX ORDER — SODIUM CHLORIDE 0.9 % (FLUSH) 0.9 %
5-40 SYRINGE (ML) INJECTION PRN
Status: DISCONTINUED | OUTPATIENT
Start: 2023-11-29 | End: 2023-12-01 | Stop reason: HOSPADM

## 2023-11-29 RX ORDER — SODIUM CHLORIDE 0.9 % (FLUSH) 0.9 %
5-40 SYRINGE (ML) INJECTION EVERY 12 HOURS SCHEDULED
Status: DISCONTINUED | OUTPATIENT
Start: 2023-11-29 | End: 2023-12-01 | Stop reason: HOSPADM

## 2023-11-29 RX ORDER — SODIUM CHLORIDE 9 MG/ML
INJECTION, SOLUTION INTRAVENOUS PRN
Status: DISCONTINUED | OUTPATIENT
Start: 2023-11-29 | End: 2023-12-01 | Stop reason: HOSPADM

## 2023-11-29 RX ORDER — ASPIRIN 300 MG/1
300 SUPPOSITORY RECTAL DAILY
Status: DISCONTINUED | OUTPATIENT
Start: 2023-11-30 | End: 2023-12-01 | Stop reason: HOSPADM

## 2023-11-29 RX ORDER — MAGNESIUM SULFATE IN WATER 40 MG/ML
2000 INJECTION, SOLUTION INTRAVENOUS ONCE
Status: COMPLETED | OUTPATIENT
Start: 2023-11-29 | End: 2023-11-29

## 2023-11-29 RX ORDER — ENOXAPARIN SODIUM 100 MG/ML
40 INJECTION SUBCUTANEOUS DAILY
Status: DISCONTINUED | OUTPATIENT
Start: 2023-11-30 | End: 2023-12-01 | Stop reason: HOSPADM

## 2023-11-29 RX ORDER — LEVOTHYROXINE SODIUM 0.05 MG/1
50 TABLET ORAL DAILY
Status: DISCONTINUED | OUTPATIENT
Start: 2023-11-29 | End: 2023-12-01 | Stop reason: HOSPADM

## 2023-11-29 RX ORDER — ATORVASTATIN CALCIUM 40 MG/1
80 TABLET, FILM COATED ORAL NIGHTLY
Status: DISCONTINUED | OUTPATIENT
Start: 2023-11-29 | End: 2023-12-01 | Stop reason: HOSPADM

## 2023-11-29 RX ORDER — ONDANSETRON 2 MG/ML
4 INJECTION INTRAMUSCULAR; INTRAVENOUS EVERY 6 HOURS PRN
Status: DISCONTINUED | OUTPATIENT
Start: 2023-11-29 | End: 2023-12-01 | Stop reason: HOSPADM

## 2023-11-29 RX ORDER — ASPIRIN 81 MG/1
81 TABLET, CHEWABLE ORAL DAILY
Status: DISCONTINUED | OUTPATIENT
Start: 2023-11-30 | End: 2023-12-01 | Stop reason: HOSPADM

## 2023-11-29 RX ADMIN — SODIUM CHLORIDE 1000 ML: 9 INJECTION, SOLUTION INTRAVENOUS at 14:52

## 2023-11-29 RX ADMIN — ATORVASTATIN CALCIUM 80 MG: 40 TABLET, FILM COATED ORAL at 23:09

## 2023-11-29 RX ADMIN — GADOTERIDOL 12 ML: 279.3 INJECTION, SOLUTION INTRAVENOUS at 20:58

## 2023-11-29 RX ADMIN — SODIUM CHLORIDE, PRESERVATIVE FREE 20 ML: 5 INJECTION INTRAVENOUS at 21:00

## 2023-11-29 RX ADMIN — LEVOTHYROXINE SODIUM 50 MCG: 0.05 TABLET ORAL at 17:26

## 2023-11-29 RX ADMIN — TENECTEPLASE 15 MG: KIT at 14:22

## 2023-11-29 RX ADMIN — MAGNESIUM SULFATE HEPTAHYDRATE 2000 MG: 40 INJECTION, SOLUTION INTRAVENOUS at 14:53

## 2023-11-29 RX ADMIN — IOPAMIDOL 100 ML: 755 INJECTION, SOLUTION INTRAVENOUS at 14:43

## 2023-11-29 RX ADMIN — ACETAMINOPHEN 650 MG: 325 TABLET ORAL at 23:09

## 2023-11-29 ASSESSMENT — PAIN DESCRIPTION - DESCRIPTORS
DESCRIPTORS: TIGHTNESS
DESCRIPTORS: ACHING

## 2023-11-29 ASSESSMENT — PAIN SCALES - GENERAL
PAINLEVEL_OUTOF10: 4
PAINLEVEL_OUTOF10: 0
PAINLEVEL_OUTOF10: 10
PAINLEVEL_OUTOF10: 4
PAINLEVEL_OUTOF10: 3
PAINLEVEL_OUTOF10: 4

## 2023-11-29 ASSESSMENT — PAIN DESCRIPTION - LOCATION
LOCATION: CHEST
LOCATION: CHEST
LOCATION: HEAD
LOCATION: CHEST

## 2023-11-29 ASSESSMENT — PAIN DESCRIPTION - ORIENTATION: ORIENTATION: ANTERIOR

## 2023-11-29 NOTE — ED NOTES
15:37 - Pt dysarthria has resolved at this time  15:52 p.m.- Pt dysarthria and aphasia has resolved at this time. 16:22 p.m.- Pt numbness/decreased sensation to L side of face and leg has resolved. Pt still has decreased sensation/numbness to posterior forearm to L arm.           Frankey Campi, RN  11/29/23 0446

## 2023-11-29 NOTE — ED NOTES
1349: Assumed care of patient at this time. Patient complaining of shortness of breath, chest pain, and difficulty speaking. Patient is slurring words and stuttering between words. Patient reports, \"I know what I want to say but I can't get it out right. \" MD notified and at bedside. 1402: Code S Level 1 called at this time. Blood sugar 98.    1403: CT notified at this time. 1406: Arrival to CT.     1415: Tele-neurologist on computer evaluating patient at this time. 1422: TNK administered at this time. 1428: CT with perfusion began at this time.       Delmi Honeycutt RN  11/29/23 5688

## 2023-11-29 NOTE — ED NOTES
TRANSFER - OUT REPORT:    Verbal report given to NAREN Perez on Pretty Godinze  being transferred to CCU for routine progression of patient care       Report consisted of patient's Situation, Background, Assessment and   Recommendations(SBAR). Information from the following report(s) Nurse Handoff Report, ED Encounter Summary, ED SBAR, and [facility] was reviewed with the receiving nurse. [place] Assessment:    Presents to emergency department  because of falls (Syncope, seizure, or loss of consciousness): No  Age > 70: No  Altered Mental Status, Intoxication with alcohol or substance confusion (Disorientation, impaired judgment, poor safety awaremess, or inability to follow instructions): No  Impaired Mobility: Ambulates or transfers with assistive devices or assistance; Unable to ambulate or transer.: No  Nursing Judgement: No          Lines:   Peripheral IV 11/29/23 Left Antecubital (Active)       Peripheral IV 11/29/23 Proximal;Right Forearm (Active)        Opportunity for questions and clarification was provided.       Patient transported with:  Monitor and Registered Nurse           Shaniqua Blevins RN  11/29/23 6107

## 2023-11-29 NOTE — PROGRESS NOTES
1800 Patient in room 2505 resting quietly in bed on RA. Vitals stable. Dual skin assessment performed by Chris RN, Chasidy RN scar noted on L knee. Vitals Q30 mins. Patient bathed w/ CHG     1834 Patient passed bedside swallow screen    1930 Bedside shift change report given to [facility] (oncoming nurse) by Tita Chen RN) (offgoing nurse). Report included the following information Nurse Handoff Report, Adult Overview, Intake/Output, MAR, Recent Results, Cardiac Rhythm NSR, and Neuro Assessment.

## 2023-11-29 NOTE — PROGRESS NOTES
1758:  Patient arrived to room 2505. Dual skin assessment completed with NAREN Umaña. Scar above left knee, no other skin issues. Vitals taken. Angela Goff RN taking over at this time.

## 2023-11-29 NOTE — ED NOTES
Pt states she is supposed to be taking levothyroxine 50 mcg every morning but has not been taking it for \"months. \" Pt also states she is supposed to be getting depo shot every 3 months for birth control but has missed the last dose due to not having insurance. Pt also takes nurte PRN.       Jorden Patten RN  11/29/23 152

## 2023-11-29 NOTE — ED NOTES
Assumed care of pt from [address] [place] [address].      Aliya Russell, RN  11/29/23 0169

## 2023-11-29 NOTE — H&P
ICU ADMISSION H&P                                                                                                                  [facility]      Chief Complaint: Slurred speech, loss of sensation on the left side of the body    HPI: The patient is a 30-year-old female who we are seeing in consultation at the request of Dr. Brandon Robledo for admission to ICU post TNK. Briefly, the patient has had to have loss of sensation of the left side of the body as well as slurred speech for which she came to ED for evaluation. She had CT head which ruled out hemorrhage, teleneurologist was consulted who recommended TNK. On my arrival, patient is sitting comfortably in the bed. She denies any complaints. Review of Systems: All other systems have been reviewed and are negative except per HPI    Past Medical History:  Past Medical History:   Diagnosis Date    Anemia     Hypothyroidism 10/3/2022    Ischemic stroke ([address]) 11/29/2023    Migraine without aura and without status migrainosus, not intractable 2/14/2023    Nontoxic multinodular goiter 2/14/2023    Thyroid disease        Past Surgical History:  Past Surgical History:   Procedure Laterality Date    APPENDECTOMY      2021       Social History:   reports that she has never smoked. She has never used smokeless tobacco. She reports that she does not drink alcohol and does not use drugs.      Family History:  Family History   Problem Relation Age of Onset    No Known Problems Father     Stroke Mother     Thyroid Disease Mother     Thyroid Disease Brother     Thyroid Disease Brother        Allergies:  No Known Allergies    Medications:   Current Facility-Administered Medications   Medication Dose Route Frequency Provider Last Rate Last Admin    magnesium sulfate 2000 mg in 50 mL IVPB premix  2,000 mg IntraVENous Once Jennifer Shipley uIU/mL Final    Color, UA 11/29/2023 YELLOW/STRAW    Final    Appearance 11/29/2023 CLEAR  CLEAR   Final    Specific [place], UA 11/29/2023 1.012    Final    pH, Urine 11/29/2023 5.5  5.0 - 8.0   Final    Protein, UA 11/29/2023 Negative  NEG mg/dL Final    Glucose, UA 11/29/2023 Negative  NEG mg/dL Final    Ketones, Urine 11/29/2023 Negative  NEG mg/dL Final    Bilirubin Urine 11/29/2023 Negative  NEG   Final    Blood, Urine 11/29/2023 Negative  NEG   Final    Urobilinogen, Urine 11/29/2023 0.2  0.2 - 1.0 EU/dL Final    Nitrite, Urine 11/29/2023 Negative  NEG   Final    Leukocyte Esterase, Urine 11/29/2023 Negative  NEG   Final    WBC, UA 11/29/2023 0-4  0 - 4 /hpf Final    RBC, UA 11/29/2023 0-5  0 - 5 /hpf Final    Epithelial Cells UA 11/29/2023 MODERATE (A)  FEW /lpf Final    BACTERIA, URINE 11/29/2023 Negative  NEG /hpf Final    Hyaline Casts, UA 11/29/2023 0-2  0 - 2 /lpf Final    Preg Test, Ur 11/29/2023 Negative  NEG   Final         Imaging:    XR CHEST PORTABLE   Final Result      No acute process on portable chest.         CTA HEAD NECK W CONTRAST   Final Result   No large vessel occlusion or hemodynamically significant carotid stenosis. No perfusion abnormality. CT BRAIN PERFUSION   Final Result   No large vessel occlusion or hemodynamically significant carotid stenosis. No perfusion abnormality. CT HEAD WO CONTRAST   Final Result         No acute abnormality      CT head without contrast    (Results Pending)       Assessment :    The patient is a 68-year-old female who we are seeing in consultation at the request of Dr. Catrachita Nicole for admission to ICU post TNK. Ischemic stroke    - Repeat CT head in 24 hours - early if there is any neurological deterioration.  - MRI if recommended by neurology  - Will start on anti-platelet.   - Con't Neuro checks q 1h while in ICU.  - TTE with bubble study  - Will use antihypertensive with Cardene if systolic BP  >104 or diastolic BP >263 with Cardene

## 2023-11-30 ENCOUNTER — APPOINTMENT (OUTPATIENT)
Facility: HOSPITAL | Age: 23
End: 2023-11-30
Payer: COMMERCIAL

## 2023-11-30 ENCOUNTER — APPOINTMENT (OUTPATIENT)
Facility: HOSPITAL | Age: 23
End: 2023-11-30
Attending: INTERNAL MEDICINE
Payer: COMMERCIAL

## 2023-11-30 ENCOUNTER — HOSPITAL ENCOUNTER (OUTPATIENT)
Facility: HOSPITAL | Age: 23
Discharge: HOME | End: 2023-12-02
Attending: INTERNAL MEDICINE

## 2023-11-30 LAB
AMPHET UR QL SCN: NEGATIVE
ANION GAP SERPL CALC-SCNC: 5 MMOL/L (ref 5–15)
BARBITURATES UR QL SCN: NEGATIVE
BENZODIAZ UR QL: NEGATIVE
BUN SERPL-MCNC: 10 MG/DL (ref 6–20)
BUN/CREAT SERPL: 15 (ref 12–20)
CALCIUM SERPL-MCNC: 8.8 MG/DL (ref 8.5–10.1)
CANNABINOIDS UR QL SCN: NEGATIVE
CHLORIDE SERPL-SCNC: 111 MMOL/L (ref 97–108)
CHOLEST SERPL-MCNC: 132 MG/DL
CO2 SERPL-SCNC: 24 MMOL/L (ref 21–32)
COCAINE UR QL SCN: NEGATIVE
CREAT SERPL-MCNC: 0.67 MG/DL (ref 0.55–1.02)
ECHO AO ROOT DIAM: 2.2 CM
ECHO AO ROOT INDEX: 1.39 CM/M2
ECHO AV AREA PEAK VELOCITY: 2.7 CM2
ECHO AV AREA PEAK VELOCITY: 2.8 CM2
ECHO AV AREA VTI: 2.6 CM2
ECHO AV AREA/BSA VTI: 1.6 CM2/M2
ECHO AV CUSP MM: 2 CM
ECHO AV MEAN GRADIENT: 3 MMHG
ECHO AV MEAN VELOCITY: 0.8 M/S
ECHO AV PEAK GRADIENT: 5 MMHG
ECHO AV PEAK GRADIENT: 5 MMHG
ECHO AV PEAK VELOCITY: 1.1 M/S
ECHO AV PEAK VELOCITY: 1.1 M/S
ECHO AV VTI: 23.5 CM
ECHO BSA: 1.65 M2
ECHO BSA: 1.65 M2
ECHO LA VOL A-L A4C: 35 ML (ref 22–52)
ECHO LA VOL MOD A4C: 31 ML (ref 22–52)
ECHO LA VOLUME INDEX A-L A4C: 22 ML/M2 (ref 16–34)
ECHO LA VOLUME INDEX MOD A4C: 20 ML/M2 (ref 16–34)
ECHO LV E' LATERAL VELOCITY: 19 CM/S
ECHO LV E' SEPTAL VELOCITY: 10 CM/S
ECHO LVOT AREA: 3.1 CM2
ECHO LVOT AV VTI INDEX: 0.86
ECHO LVOT DIAM: 2 CM
ECHO LVOT MEAN GRADIENT: 2 MMHG
ECHO LVOT PEAK GRADIENT: 4 MMHG
ECHO LVOT PEAK GRADIENT: 4 MMHG
ECHO LVOT PEAK VELOCITY: 1 M/S
ECHO LVOT PEAK VELOCITY: 1 M/S
ECHO LVOT STROKE VOLUME INDEX: 40.3 ML/M2
ECHO LVOT SV: 63.7 ML
ECHO LVOT VTI: 20.3 CM
ECHO MV A VELOCITY: 0.54 M/S
ECHO MV AREA VTI: 2.4 CM2
ECHO MV E DECELERATION TIME (DT): 153.9 MS
ECHO MV E VELOCITY: 0.78 M/S
ECHO MV E/A RATIO: 1.44
ECHO MV E/E' LATERAL: 4.11
ECHO MV E/E' RATIO (AVERAGED): 5.95
ECHO MV LVOT VTI INDEX: 1.32
ECHO MV MAX VELOCITY: 1 M/S
ECHO MV MEAN GRADIENT: 1 MMHG
ECHO MV MEAN VELOCITY: 0.5 M/S
ECHO MV PEAK GRADIENT: 4 MMHG
ECHO MV VTI: 26.8 CM
ECHO PULMONARY ARTERY END DIASTOLIC PRESSURE: 2 MMHG
ECHO PV MAX VELOCITY: 1 M/S
ECHO PV PEAK GRADIENT: 4 MMHG
ECHO PV REGURGITANT MAX VELOCITY: 0.7 M/S
ECHO RV FREE WALL PEAK S': 14 CM/S
ECHO RV INTERNAL DIMENSION: 3.1 CM
EKG ATRIAL RATE: 115 BPM
EKG DIAGNOSIS: NORMAL
EKG P AXIS: 75 DEGREES
EKG P-R INTERVAL: 148 MS
EKG Q-T INTERVAL: 302 MS
EKG QRS DURATION: 70 MS
EKG QTC CALCULATION (BAZETT): 417 MS
EKG R AXIS: 84 DEGREES
EKG T AXIS: 62 DEGREES
EKG VENTRICULAR RATE: 115 BPM
ERYTHROCYTE [DISTWIDTH] IN BLOOD BY AUTOMATED COUNT: 13.6 % (ref 11.5–14.5)
EST. AVERAGE GLUCOSE BLD GHB EST-MCNC: 85 MG/DL
GLUCOSE SERPL-MCNC: 88 MG/DL (ref 65–100)
HBA1C MFR BLD: 4.6 % (ref 4–5.6)
HCT VFR BLD AUTO: 39.5 % (ref 35–47)
HDLC SERPL-MCNC: 51 MG/DL
HDLC SERPL: 2.6 (ref 0–5)
HGB BLD-MCNC: 13 G/DL (ref 11.5–16)
LDLC SERPL CALC-MCNC: 73.4 MG/DL (ref 0–100)
Lab: ABNORMAL
MCH RBC QN AUTO: 28.6 PG (ref 26–34)
MCHC RBC AUTO-ENTMCNC: 32.9 G/DL (ref 30–36.5)
MCV RBC AUTO: 86.8 FL (ref 80–99)
METHADONE UR QL: NEGATIVE
NRBC # BLD: 0 K/UL (ref 0–0.01)
NRBC BLD-RTO: 0 PER 100 WBC
OPIATES UR QL: POSITIVE
PCP UR QL: NEGATIVE
PLATELET # BLD AUTO: 334 K/UL (ref 150–400)
PMV BLD AUTO: 10.6 FL (ref 8.9–12.9)
POTASSIUM SERPL-SCNC: 3.4 MMOL/L (ref 3.5–5.1)
RBC # BLD AUTO: 4.55 M/UL (ref 3.8–5.2)
SODIUM SERPL-SCNC: 140 MMOL/L (ref 136–145)
TRIGL SERPL-MCNC: 38 MG/DL
VLDLC SERPL CALC-MCNC: 7.6 MG/DL
WBC # BLD AUTO: 7.2 K/UL (ref 3.6–11)

## 2023-11-30 PROCEDURE — 6370000000 HC RX 637 (ALT 250 FOR IP): Performed by: NURSE PRACTITIONER

## 2023-11-30 PROCEDURE — 6370000000 HC RX 637 (ALT 250 FOR IP): Performed by: INTERNAL MEDICINE

## 2023-11-30 PROCEDURE — 2580000003 HC RX 258: Performed by: INTERNAL MEDICINE

## 2023-11-30 PROCEDURE — 1100000000 HC RM PRIVATE

## 2023-11-30 PROCEDURE — 99291 CRITICAL CARE FIRST HOUR: CPT | Performed by: INTERNAL MEDICINE

## 2023-11-30 PROCEDURE — 85306 CLOT INHIBIT PROT S FREE: CPT

## 2023-11-30 PROCEDURE — 81241 F5 GENE: CPT

## 2023-11-30 PROCEDURE — 83036 HEMOGLOBIN GLYCOSYLATED A1C: CPT

## 2023-11-30 PROCEDURE — 70450 CT HEAD/BRAIN W/O DYE: CPT

## 2023-11-30 PROCEDURE — 6360000002 HC RX W HCPCS: Performed by: INTERNAL MEDICINE

## 2023-11-30 PROCEDURE — 97535 SELF CARE MNGMENT TRAINING: CPT | Performed by: OCCUPATIONAL THERAPIST

## 2023-11-30 PROCEDURE — 36415 COLL VENOUS BLD VENIPUNCTURE: CPT

## 2023-11-30 PROCEDURE — 85027 COMPLETE CBC AUTOMATED: CPT

## 2023-11-30 PROCEDURE — 85301 ANTITHROMBIN III ANTIGEN: CPT

## 2023-11-30 PROCEDURE — 97161 PT EVAL LOW COMPLEX 20 MIN: CPT

## 2023-11-30 PROCEDURE — 85300 ANTITHROMBIN III ACTIVITY: CPT

## 2023-11-30 PROCEDURE — 36591 DRAW BLOOD OFF VENOUS DEVICE: CPT

## 2023-11-30 PROCEDURE — 80048 BASIC METABOLIC PNL TOTAL CA: CPT

## 2023-11-30 PROCEDURE — 85303 CLOT INHIBIT PROT C ACTIVITY: CPT

## 2023-11-30 PROCEDURE — 93306 TTE W/DOPPLER COMPLETE: CPT

## 2023-11-30 PROCEDURE — 80307 DRUG TEST PRSMV CHEM ANLYZR: CPT

## 2023-11-30 PROCEDURE — 97530 THERAPEUTIC ACTIVITIES: CPT

## 2023-11-30 PROCEDURE — 80061 LIPID PANEL: CPT

## 2023-11-30 PROCEDURE — 97165 OT EVAL LOW COMPLEX 30 MIN: CPT | Performed by: OCCUPATIONAL THERAPIST

## 2023-11-30 RX ORDER — POTASSIUM CHLORIDE 20 MEQ/1
40 TABLET, EXTENDED RELEASE ORAL ONCE
Status: COMPLETED | OUTPATIENT
Start: 2023-11-30 | End: 2023-11-30

## 2023-11-30 RX ORDER — MORPHINE SULFATE 4 MG/ML
4 INJECTION, SOLUTION INTRAMUSCULAR; INTRAVENOUS ONCE
Status: COMPLETED | OUTPATIENT
Start: 2023-11-30 | End: 2023-11-30

## 2023-11-30 RX ADMIN — Medication 1 AMPULE: at 09:00

## 2023-11-30 RX ADMIN — ASPIRIN 81 MG: 81 TABLET, CHEWABLE ORAL at 18:19

## 2023-11-30 RX ADMIN — ACETAMINOPHEN 650 MG: 325 TABLET ORAL at 11:19

## 2023-11-30 RX ADMIN — SODIUM CHLORIDE, PRESERVATIVE FREE 10 ML: 5 INJECTION INTRAVENOUS at 09:00

## 2023-11-30 RX ADMIN — ENOXAPARIN SODIUM 40 MG: 100 INJECTION SUBCUTANEOUS at 18:21

## 2023-11-30 RX ADMIN — Medication 1 AMPULE: at 19:58

## 2023-11-30 RX ADMIN — MORPHINE SULFATE 4 MG: 4 INJECTION, SOLUTION INTRAMUSCULAR; INTRAVENOUS at 13:28

## 2023-11-30 RX ADMIN — SODIUM CHLORIDE, PRESERVATIVE FREE 10 ML: 5 INJECTION INTRAVENOUS at 19:58

## 2023-11-30 RX ADMIN — LEVOTHYROXINE SODIUM 50 MCG: 0.05 TABLET ORAL at 05:51

## 2023-11-30 RX ADMIN — POTASSIUM CHLORIDE 40 MEQ: 1500 TABLET, EXTENDED RELEASE ORAL at 05:51

## 2023-11-30 RX ADMIN — ATORVASTATIN CALCIUM 80 MG: 40 TABLET, FILM COATED ORAL at 19:58

## 2023-11-30 ASSESSMENT — PAIN DESCRIPTION - ORIENTATION
ORIENTATION: RIGHT;UPPER
ORIENTATION: MID;ANTERIOR

## 2023-11-30 ASSESSMENT — PAIN SCALES - GENERAL
PAINLEVEL_OUTOF10: 4
PAINLEVEL_OUTOF10: 0
PAINLEVEL_OUTOF10: 5
PAINLEVEL_OUTOF10: 0

## 2023-11-30 ASSESSMENT — PAIN DESCRIPTION - DESCRIPTORS
DESCRIPTORS: PRESSURE
DESCRIPTORS: ACHING;THROBBING

## 2023-11-30 ASSESSMENT — PAIN DESCRIPTION - LOCATION
LOCATION: HEAD
LOCATION: CHEST

## 2023-11-30 NOTE — PROGRESS NOTES
1930 Bedside shift change report given to NAREN HERNANDEZ (oncoming nurse) by [place], RN (offgoing nurse). Report included the following information Nurse Handoff Report, Adult Overview, MAR, and Cardiac Rhythm SR .     2000 Shift assessment completed, see flowsheets for details. Patient resting comfortably on room air. NIH 1 (tingling/numbness in LUE). Beef makes patient sick. Chicken and pork are tolerated fine. 2019-2100 Patient off the floor to MRI assisted by this RN and Apple, PCT.     2200 NIH 0.    2300 Patient developing headache since MRI - spoke with YUN Manjarrez. Orders received for PRN tylenol. 0000 Reassessment completed, no significant changes. 0400 Reassessment completed, no significant changes. 0715 Bedside shift change report given to Dejan Knox RN (oncoming nurse) by NAREN HERNANDEZ (offgoing nurse).  Report included the following information Nurse Handoff Report, Adult Overview, Intake/Output, MAR, Recent Results, and Cardiac Rhythm SR .

## 2023-11-30 NOTE — INTERDISCIPLINARY ROUNDS
Critical care interdisciplinary rounds today. Following members present: Case Management, , Clinical Lead, Diabetes Team, Nursing, Nutrition, Pharmacy, and Physician. Family invited to participate. Plan of care discussed. See clinical pathway for plan of care and interventions and desired outcomes.

## 2023-11-30 NOTE — PROGRESS NOTES
Nutrition Note    MST triggered for enteral nutrition, however this was incorrect. Pt has no hx of TF/TPN. Please reconsult prn, otherwise pt will be assessed per LOS protocol.      Electronically signed by Fouzia Oviedo RD, [facility] on 11/30/23 at 11:59 AM EST    Contact: ext [phone]

## 2023-11-30 NOTE — PROGRESS NOTES
Speech Pathology Note    Reviewed chart and note patient admitted with stuttering speech and migraine with concern for CVA now s/p TNK. Note CT showed \"no acute abnormality\" and MRI showed \"normal brain MRI. \" Note patient passed the [address] and a clear liquid diet was ordered. NIHSS=0. Per MD, patient to be advanced to regular diet. Discussed case with RN who reported no SLP-related concerns. Introduced self and role of SLP to patient. Patient reported concerns with motor speech, but states symptoms have resolved. Patient denied any decline in language, cognitive, or swallowing function, and patient informally observed drinking thin liquids with no difficulty. Patient Ox4. Formal SLP evaluation not clinically indicated at this time. Will sign off. Please re-consult if further needs arise. Thank you.     Shamika Choe M.S., CCC-SLP

## 2023-11-30 NOTE — CONSULTS
Date of Consultation:  November 30, 2023    Referring Physician: Amanda Brock MD     Reason for Consultation: Word finding difficulty, tingling and numbness in the left arm and leg now resolved    Chief Complaint   Patient presents with    Shortness of Breath     Pt states she has chest pain and shortness of breath that started around 0500. She states now she's having difficulty talking, states she normally can talk normal.        History of Present Illness:   Genie Howard is a 21 y.o. female with history of anemia, hypothyroidism on Synthroid, history of chronic migraine on Nurtec, presenting with acute onset chest pain followed by shortness of breath, word finding difficulties and paresthesias in the left leg and left arm, received TNK for concern for stroke. Symptoms resolved after TNK. MRI of the brain was done which showed no evidence of acute infarct. Patient reports that she was in her usual state of health. She has been having chest pain last few days feeling that someone is sitting on her chest.  It was difficult to take deep breaths. This became concerning to her and she became anxious and went to the ER to have this checked out and while in the ER she began developing difficulty getting her words out as well as tingling that started in her left leg and slowly went to her left arm. After the tingling occurred, she started to feel that it was completely numb and she could not feel anything. During all this time she had been having worsening of her migraine headaches as she is on her period. She has had chronic migraine headaches without ever having any strokelike symptoms with them. She has been following with her PCP who has prescribed Nurtec as an abortive. She does feel like this has been helping. She denies any history of clotting disorder, VTE, history of miscarriages. She did have healthy pregnancies. No history of young strokes in her family.   But she does state that her grandmother resolved status post TNK. Differential diagnosis includes complex migraine/hemiplegic migraine, stroke aborted by TNK, anxiety. MRI brain negative. - Recommend maintaining SBP <180/105 for 24 hrs from symptom onset and then BP goal is less than 140/90 (this is the long term goal)   -Patient needs a head CT 24 hours post TNK prior to starting antiplatelet therapy  - Pending hypercoag panels  - would recommend to start aspirin 81mg daily for secondary stroke prevention (to be started after head CT negative for any hemorrhage)  - Risk factor modification: LDL at 73.4, hemoglobin A1c pending.  - please obtain TTE to rule out intracardiac thrombus, PFO, if negative for PFO, would recommend DAYANARA  - would monitor on telemetry to rule out arrhythmias    - please obtain PT/OT and speech consultations  - Patient would benefit from 30-day cardiac event monitor on discharge given presentation with chest pain and shortness of breath palpitations    #chronic migraines  -Continue Nurtec for now in the outpatient setting, discussed with patient that she would benefit from preventative Nurtec rather than only abortive  - We will follow-up with patient in the outpatient setting for chronic migraine management  - Avoid triptans, discussed risk of OCPs containing estrogen, would recommend avoiding this. #chest pain, SOB, palpitations   -Work-up per primary team.      We discussed extensively the importance of lifestyle modification including smoking cessation, diet, and incorporating exercise into daily routine. Thank you very much for this consultation. Neurology will continue to follow. I spent 60 minutes providing critical care to this acutely ill inpatient with > 50% of the time counseling as well as reviewing the patient's chart, notes, labs, medications and preparing documentation along with assisting in the coordination of care of the patient on the patient's hospital floor/unit.        Elodia Reyes DO

## 2023-11-30 NOTE — PROGRESS NOTES
OCCUPATIONAL THERAPY EVALUATION/DISCHARGE  Patient: Aline Branch (69 y.o. female)  Date: 11/30/2023  Primary Diagnosis: Ischemic stroke [facility]) [I63.9]  Cerebrovascular accident (CVA), unspecified mechanism ([address]) [I63.9]         Precautions:                    ASSESSMENT :  Based on the objective data below, the patient presents with intact and equal strength, coordination and sensation, as well as intact balance for the performance of functional activity. Patient also denies changes in vision and her cognition is at baseline. She is currently performing ADLs and functional mobility at her independent baseline. She is without further OT needs, acutely and after discharge. Further skilled acute occupational therapy is not indicated at this time. PLAN :  Discharge from Acute OT    Recommendation for discharge: (in order for the patient to meet his/her long term goals): No skilled occupational therapy    IF patient discharges home will need the following DME: none       OBJECTIVE DATA SUMMARY:     Past Medical History:   Diagnosis Date    Anemia     Hypothyroidism 10/3/2022    Ischemic stroke ([address]) 11/29/2023    Migraine without aura and without status migrainosus, not intractable 2/14/2023    Nontoxic multinodular goiter 2/14/2023    Thyroid disease      Past Surgical History:   Procedure Laterality Date    APPENDECTOMY      2021       Prior Level of Function/Environment/Context: Fully independent,  and working as an .       Expanded or extensive additional review of patient history:   Lives With: Spouse  Type of Home: House      Hand Dominance: right     EXAMINATION OF PERFORMANCE DEFICITS:    Cognitive/Behavioral Status:    Orientation  Overall Orientation Status: Within Normal Limits  Orientation Level: Oriented X4       Hearing:        Vision/Perceptual:    Vision - Basic Assessment  Prior Vision: Wears glasses all the time        Perception  Overall Perceptual Status: WFL    Range of

## 2023-11-30 NOTE — PROGRESS NOTES
0700  Bedside and verbal report from Dimitri Tejada RN    0800 Assessment completed. NIH 0 Denies complaints.  at bedside. 1000  Seen and cleared by PT, OT, and speech. Neuro here to see patient. Labs ordered. 1100 Ordered bloodwork (from neuro consult) drawn and sent to the lab. 1119 Medicated with tylenol 650 mg po for complaint of headache. 1130  Bedside Echo    1200  States headache is \"gone. \"  Now eating food that  brought from cafeteria (salad and french fries, with Pepsi and iced tea). 1300  Resting in bed, denies complaints. 1320  Now rings out, complains of right upper chest pressure, palpable in rib area, worse with breathing. Dr. Macrina Barcenas at bedside to evaluate patient. 1328  Morphine 4 mg IV given x 1 dose per Dr. Macrina Barcenas    1400  Now napping at intervals. Patient states that the chest pain is now \"gone. \"  NIH remains 0    1500  No changes noted. NIH 0    1515  To CT for post 24 hour after TNK scan, via wheelchair on monitor. [phone]  Returned to CCU room 2505. Patient self bathed, oral care completed, assisted back to bed. Neurologist on phone with patient informing  her re: DAYANARA for tomorrow (Friday, 12/1/23)    1600  Back to bed with minimal assistance. Reassessment completed. Transfer orders placed by Dr. Macrina Barcenas.    1800  Napping at intervals.  at bedside. 1815  SBAR report called to Marlys Ware RN on Neuro unit. 1830  Transferred via wheelchair, on monitor, to room 141. Accompanied by RN and patient's .

## 2023-11-30 NOTE — PROGRESS NOTES
[id number]  Stroke Education provided to patient and the following topics were discussed    1. Patients personal risk factors for stroke are family history    2. Warning signs of Stroke:        * Sudden numbness or weakness of the face, arm or leg, especially on one side of          The body            * Sudden confusion, trouble speaking or understanding        * Sudden trouble seeing in one or both eyes        * Sudden trouble walking, dizziness, loss of balance or coordination        * Sudden severe headache with no known cause      3. Importance of activation Emergency Medical Services ( 9-1-1 ) immediately if experience any warning signs of stroke. 4. Be sure and schedule a follow-up appointment with your primary care doctor or any specialists as instructed. 5. You must take medicine every day to treat your risk factors for stroke. Be sure to take your medicines exactly as your doctor tells you: no more, no less. Know what your medicines are for , what they do. Anti-thrombotics /anticoagulants can help prevent strokes. You are taking the following medicine(s)  Depo, Nurtec, Synthroid     6. Smoking and second-hand smoke greatly increase your risk of stroke, cardiovascular disease and death. Smoking history never    7. Information provided was [facility] Stroke Education Binder    8. Documentation of teaching completed in Patient Education Activity and on Care Plan with teaching response noted?   no

## 2023-11-30 NOTE — PROGRESS NOTES
[facility]  Stroke treatment brochure was provided to: patient. Rationale for acute work-up of symptoms explained. Possible treatments, such as tPA or intervention for ischemic strokes and the need for a quick work-up, have been reviewed.

## 2023-12-01 ENCOUNTER — APPOINTMENT (OUTPATIENT)
Facility: HOSPITAL | Age: 23
End: 2023-12-01
Attending: INTERNAL MEDICINE
Payer: COMMERCIAL

## 2023-12-01 VITALS
BODY MASS INDEX: 21.88 KG/M2 | HEART RATE: 65 BPM | RESPIRATION RATE: 16 BRPM | HEIGHT: 63 IN | DIASTOLIC BLOOD PRESSURE: 61 MMHG | SYSTOLIC BLOOD PRESSURE: 107 MMHG | TEMPERATURE: 97.9 F | WEIGHT: 123.46 LBS | OXYGEN SATURATION: 99 %

## 2023-12-01 LAB
ALBUMIN SERPL-MCNC: 3.5 G/DL (ref 3.5–5)
ALBUMIN/GLOB SERPL: 1.1 (ref 1.1–2.2)
ALP SERPL-CCNC: 48 U/L (ref 45–117)
ALT SERPL-CCNC: 19 U/L (ref 12–78)
ANION GAP SERPL CALC-SCNC: 3 MMOL/L (ref 5–15)
AST SERPL-CCNC: 13 U/L (ref 15–37)
BASOPHILS # BLD: 0 K/UL (ref 0–0.1)
BASOPHILS NFR BLD: 1 % (ref 0–1)
BILIRUB SERPL-MCNC: 0.4 MG/DL (ref 0.2–1)
BUN SERPL-MCNC: 16 MG/DL (ref 6–20)
BUN/CREAT SERPL: 24 (ref 12–20)
CALCIUM SERPL-MCNC: 9 MG/DL (ref 8.5–10.1)
CHLORIDE SERPL-SCNC: 113 MMOL/L (ref 97–108)
CO2 SERPL-SCNC: 25 MMOL/L (ref 21–32)
CREAT SERPL-MCNC: 0.67 MG/DL (ref 0.55–1.02)
DIFFERENTIAL METHOD BLD: ABNORMAL
ECHO BSA: 1.65 M2
EOSINOPHIL # BLD: 0.3 K/UL (ref 0–0.4)
EOSINOPHIL NFR BLD: 5 % (ref 0–7)
ERYTHROCYTE [DISTWIDTH] IN BLOOD BY AUTOMATED COUNT: 13.6 % (ref 11.5–14.5)
GLOBULIN SER CALC-MCNC: 3.1 G/DL (ref 2–4)
GLUCOSE SERPL-MCNC: 92 MG/DL (ref 65–100)
HCT VFR BLD AUTO: 39.4 % (ref 35–47)
HGB BLD-MCNC: 12.8 G/DL (ref 11.5–16)
IMM GRANULOCYTES # BLD AUTO: 0 K/UL (ref 0–0.04)
IMM GRANULOCYTES NFR BLD AUTO: 1 % (ref 0–0.5)
LYMPHOCYTES # BLD: 2.1 K/UL (ref 0.8–3.5)
LYMPHOCYTES NFR BLD: 35 % (ref 12–49)
MCH RBC QN AUTO: 28.3 PG (ref 26–34)
MCHC RBC AUTO-ENTMCNC: 32.5 G/DL (ref 30–36.5)
MCV RBC AUTO: 87.2 FL (ref 80–99)
MONOCYTES # BLD: 0.4 K/UL (ref 0–1)
MONOCYTES NFR BLD: 6 % (ref 5–13)
NEUTS SEG # BLD: 3.1 K/UL (ref 1.8–8)
NEUTS SEG NFR BLD: 52 % (ref 32–75)
NRBC # BLD: 0 K/UL (ref 0–0.01)
NRBC BLD-RTO: 0 PER 100 WBC
PLATELET # BLD AUTO: 326 K/UL (ref 150–400)
PMV BLD AUTO: 10.1 FL (ref 8.9–12.9)
POTASSIUM SERPL-SCNC: 3.8 MMOL/L (ref 3.5–5.1)
PROT SERPL-MCNC: 6.6 G/DL (ref 6.4–8.2)
RBC # BLD AUTO: 4.52 M/UL (ref 3.8–5.2)
SODIUM SERPL-SCNC: 141 MMOL/L (ref 136–145)
WBC # BLD AUTO: 5.9 K/UL (ref 3.6–11)

## 2023-12-01 PROCEDURE — 6370000000 HC RX 637 (ALT 250 FOR IP): Performed by: INTERNAL MEDICINE

## 2023-12-01 PROCEDURE — 36415 COLL VENOUS BLD VENIPUNCTURE: CPT

## 2023-12-01 PROCEDURE — 99233 SBSQ HOSP IP/OBS HIGH 50: CPT | Performed by: INTERNAL MEDICINE

## 2023-12-01 PROCEDURE — 85025 COMPLETE CBC W/AUTO DIFF WBC: CPT

## 2023-12-01 PROCEDURE — 6360000002 HC RX W HCPCS: Performed by: INTERNAL MEDICINE

## 2023-12-01 PROCEDURE — 2580000003 HC RX 258: Performed by: INTERNAL MEDICINE

## 2023-12-01 PROCEDURE — 93312 ECHO TRANSESOPHAGEAL: CPT

## 2023-12-01 PROCEDURE — 80053 COMPREHEN METABOLIC PANEL: CPT

## 2023-12-01 RX ORDER — ASPIRIN 81 MG/1
81 TABLET, CHEWABLE ORAL DAILY
Qty: 30 TABLET | Refills: 3 | Status: SHIPPED | OUTPATIENT
Start: 2023-12-02

## 2023-12-01 RX ORDER — LEVOTHYROXINE SODIUM 0.05 MG/1
50 TABLET ORAL
Qty: 30 TABLET | Refills: 5 | Status: SHIPPED | OUTPATIENT
Start: 2023-12-01

## 2023-12-01 RX ORDER — FENTANYL CITRATE 50 UG/ML
INJECTION, SOLUTION INTRAMUSCULAR; INTRAVENOUS PRN
Status: COMPLETED | OUTPATIENT
Start: 2023-12-01 | End: 2023-12-01

## 2023-12-01 RX ORDER — LIDOCAINE HYDROCHLORIDE 20 MG/ML
SOLUTION OROPHARYNGEAL PRN
Status: COMPLETED | OUTPATIENT
Start: 2023-12-01 | End: 2023-12-01

## 2023-12-01 RX ORDER — MIDAZOLAM HYDROCHLORIDE 1 MG/ML
INJECTION INTRAMUSCULAR; INTRAVENOUS PRN
Status: COMPLETED | OUTPATIENT
Start: 2023-12-01 | End: 2023-12-01

## 2023-12-01 RX ADMIN — SODIUM CHLORIDE, PRESERVATIVE FREE 10 ML: 5 INJECTION INTRAVENOUS at 08:47

## 2023-12-01 RX ADMIN — FENTANYL CITRATE 25 MCG: 50 INJECTION, SOLUTION INTRAMUSCULAR; INTRAVENOUS at 11:08

## 2023-12-01 RX ADMIN — LIDOCAINE HYDROCHLORIDE 10 ML: 20 SOLUTION ORAL at 11:06

## 2023-12-01 RX ADMIN — ENOXAPARIN SODIUM 40 MG: 100 INJECTION SUBCUTANEOUS at 08:43

## 2023-12-01 RX ADMIN — MIDAZOLAM HYDROCHLORIDE 1 MG: 1 INJECTION, SOLUTION INTRAMUSCULAR; INTRAVENOUS at 11:10

## 2023-12-01 RX ADMIN — MIDAZOLAM HYDROCHLORIDE 1 MG: 1 INJECTION, SOLUTION INTRAMUSCULAR; INTRAVENOUS at 11:08

## 2023-12-01 RX ADMIN — ASPIRIN 81 MG: 81 TABLET, CHEWABLE ORAL at 08:45

## 2023-12-01 RX ADMIN — BENZOCAINE, BUTAMBEN, AND TETRACAINE HYDROCHLORIDE 1 SPRAY: .028; .004; .004 AEROSOL, SPRAY TOPICAL at 11:06

## 2023-12-01 RX ADMIN — FENTANYL CITRATE 25 MCG: 50 INJECTION, SOLUTION INTRAMUSCULAR; INTRAVENOUS at 11:10

## 2023-12-01 RX ADMIN — LEVOTHYROXINE SODIUM 50 MCG: 0.05 TABLET ORAL at 05:55

## 2023-12-01 NOTE — PROGRESS NOTES
End of Shift Note    Bedside shift change report given to  (oncoming nurse) by Johanny Sr RN (offgoing nurse). Report included the following information Nurse Handoff Report      Shift worked:  Night   Shift summary and any significant changes:       Patient was running sinus gomez over the night. Her blood pressure was as low as 100/54mmhg. She has been NPO since midnight . Scheduled for DAYANARA today. For cardiologist consult today. Concerns for physician to address:  Low blood pressure   Zone phone for oncoming shift:   [phone]     Patient Information  Zain Ambrosio  23 y.o.  11/29/2023  1:44 PM by Sana Fritz MD. Zain Ambrosio was admitted from Home    Problem List  Patient Active Problem List    Diagnosis Date Noted    Ischemic stroke ([address]) 11/29/2023    Nontoxic multinodular goiter 02/14/2023    Migraine without aura and without status migrainosus, not intractable 02/14/2023    Hypothyroidism 10/03/2022     Past Medical History:   Diagnosis Date    Anemia     Hypothyroidism 10/3/2022    Ischemic stroke ([address]) 11/29/2023    Migraine without aura and without status migrainosus, not intractable 2/14/2023    Nontoxic multinodular goiter 2/14/2023    Thyroid disease        Core Measures:  CVA: Yes Yes  CHF:Yes Yes  PNA:YesYes    Activity:  Activity: In bed  Number times ambulated in hallways past shift: 0  Number of times OOB to chair past shift: 0    Cardiac:   Cardiac Monitoring: Yes      Cardiac Rhythm: Sinus rhythm    Access:   Current line(s): PIV  Central Line? No Placement date  Reason Medically Necessary   PICC LINE? No Placement date Reason Medically Necessary     Genitourinary:   Urinary status: voiding   Urinary Catheter?  No Placement Date  Reason Medically Necessary     Respiratory:   O2 Device: None (Room air)  Chronic home O2 use?: no  Incentive spirometer at bedside: no       GI:  Last BM (including prior to admit): 11/29/23  Current diet:  Diet NPO  Passing flatus: yes  Tolerating current diet:        Pain Management:   Patient states pain is manageable on current regimen: yes    Skin:  Sameer Scale Score: 21  Interventions: specialty bed    Patient Safety:  Fall Score:  Sheth Total Score: 20  Interventions: bed/chair alarm and assistive devices (walker, cane, etc.)     @Rollbelt  @dexterity to release roll belt  Yes/No ( must document dexterity  here by stating Yes or No here, otherwise this is a restraint and must follow restraint documentation policy.)    DVT prophylaxis:  DVT prophylaxis Med- yes  DVT prophylaxis SCD or FAIZA- no     Wounds: (If Applicable)  Wounds- no  Location     Active Consults:  IP CONSULT TO INTENSIVIST  IP CONSULT TO NEUROLOGY  IP CONSULT TO CARDIOLOGY    Length of Stay:  Expected LOS: 2  Actual LOS: 2  Discharge Plan: yes       Aby Novak RN

## 2023-12-01 NOTE — BRIEF OP NOTE
Brief Postoperative Note      Patient: Nabila Boudreaux  YOB: 2000  MRN: [id number]    Date of Procedure: 12/1/2023    * No Diagnosis Codes entered *    Post-Op Diagnosis:  no PFO           * No surgeons listed *    Assistant:  * No surgical staff found *    Anesthesia: * No surgery found *    Estimated Blood Loss (mL): Minimal    Complications: None    Specimens:   * No specimens in log *    Implants:  * No implants in log *      Drains: * No LDAs found *    Findings: as above      Electronically signed by Shane Hernandez DO on 12/1/2023 at 11:16 AM

## 2023-12-01 NOTE — PROGRESS NOTES
11:22 AM    Pt sedated with 2 mg Versed and 50 mcg Fentanyl for DAYANARA (monitored sedation from 1107 to 1118)

## 2023-12-01 NOTE — PROGRESS NOTES
End of Shift Note    Bedside shift change report given to [address] (oncoming nurse) by Perri Eden RN (offgoing nurse). Report included the following information Nurse Handoff Report      Shift worked:  Night   Shift summary and any significant changes:       Patient was running sinus gomez over the night. Her blood pressure was as low as 100/54mmhg. She has been NPO since midnight . Scheduled for DAYANARA today. For cardiologist consult today. Concerns for physician to address:  Low blood pressure   Zone phone for oncoming shift:   [phone]     Patient Information  Stefani Schmitt  23 y.o.  11/29/2023  1:44 PM by Fox Oliveira MD. Stefani Schmitt was admitted from Home    Problem List  Patient Active Problem List    Diagnosis Date Noted    Ischemic stroke ([address]) 11/29/2023    Nontoxic multinodular goiter 02/14/2023    Migraine without aura and without status migrainosus, not intractable 02/14/2023    Hypothyroidism 10/03/2022     Past Medical History:   Diagnosis Date    Anemia     Hypothyroidism 10/3/2022    Ischemic stroke ([address]) 11/29/2023    Migraine without aura and without status migrainosus, not intractable 2/14/2023    Nontoxic multinodular goiter 2/14/2023    Thyroid disease        Core Measures:  CVA: Yes Yes  CHF:Yes Yes  PNA:YesYes    Activity:  Activity: In bed  Number times ambulated in hallways past shift: 0  Number of times OOB to chair past shift: 0    Cardiac:   Cardiac Monitoring: Yes      Cardiac Rhythm: Sinus rhythm    Access:   Current line(s): PIV  Central Line? No Placement date  Reason Medically Necessary   PICC LINE? No Placement date Reason Medically Necessary     Genitourinary:   Urinary status: voiding   Urinary Catheter?  No Placement Date  Reason Medically Necessary     Respiratory:   O2 Device: None (Room air)  Chronic home O2 use?: no  Incentive spirometer at bedside: no       GI:  Last BM (including prior to admit): 11/29/23  Current diet:  Diet NPO  Passing flatus:

## 2023-12-01 NOTE — PROGRESS NOTES
Patient arrived to Non-Invasive Cardiology Lab for In Patient DAYANARA Procedure. Staff introduced to patient. Patient identifiers verified with Name and Date of Birth. Procedure verified with patient. Consent forms reviewed and signed by patient or authorized representative and verified. Allergies verified. Patient informed of procedure and plan of care. Questions answered with review. Patient on cardiac monitor, non-invasive blood pressure, SPO2 monitor. On RA. Patient is A&Ox3. Patient reports no complaints. Patient on stretcher, in low position, with side rails up. Patient instructed to call for assistance as needed.

## 2023-12-01 NOTE — PROGRESS NOTES
Discharge instructions have been gone over with the patient. Patient is excited about discharge. Patient IV and telemetry have been removed. Patient has no further questions at this time.

## 2023-12-01 NOTE — PROGRESS NOTES
Date:2023      Re: Rafi Shown ,  2000      To whom it may concern;     Please excuse Rafi Shown from work from 2023  to 12/10/2023  as she was under my care in the hospital and at home for a medical condition. She may resume work on 2023  resuming her full duties, please call with questions. Thank you     Bob Tejeda M.D.   [facility], [address] [place] [address]     Phone [phone]  Fax [phone]

## 2023-12-01 NOTE — DISCHARGE SUMMARY
Hospitalist Discharge Note    NAME:   Pretty Godinez   : 2000   MRN: [id number]     Admit date: 2023    Discharge date: 23    PCP: REYMUNDO Talbert NP    Discharge Diagnoses:    Suspected complex migraine > ischemic stroke 2023 POA    Hypothyroidism POA TSH 26.8     Code Status: full code    Discharge Medications:  Current Discharge Medication List        START taking these medications    Details   aspirin 81 MG chewable tablet Take 1 tablet by mouth daily  Qty: 30 tablet, Refills: 3           CONTINUE these medications which have CHANGED    Details   levothyroxine (SYNTHROID) 50 MCG tablet Take 1 tablet by mouth every morning (before breakfast)  Qty: 30 tablet, Refills: 5           CONTINUE these medications which have NOT CHANGED    Details   medroxyPROGESTERone (DEPO-PROVERA) 150 MG/ML injection 1 mL      NURTEC 75 MG TBDP TAKE 1 TABLET BY MOUTH ONCE AS NEEDED FOR MIGRAINE FOR UP TO 1 DOSE.  NO MORE THAN 1 DOSE IN 24 HOURS  Qty: 8 tablet, Refills: 1    Associated Diagnoses: Migraine without aura, not intractable, without status migrainosus             Follow-up Information       Follow up With Specialties Details Why Contact Info    REYMUNDO Talbert NP Nurse Practitioner Schedule an appointment as soon as possible for a visit in 1 week(s)  [address] [place] [address]  [phone]      REYMUNDO Talbert NP Nurse Practitioner   [address] Neurology Schedule an appointment as soon as possible for a visit in 2 week(s)  [address] [place] [id number] [facility] [phone]              Time spent on discharge:   I spent 38 minutes on discharge, seeing and examining the patient, reconciling home meds and new meds, coordinating care with case management, doing the discharge papers and the D/C summary    Discharge disposition: home    Discharge Condition: Stable    Summary of No jaundice    Reviewed most current lab test results and cultures  YES  Reviewed most current radiology test results   YES  Review and summation of old records today    NO  Reviewed patient's current orders and MAR    YES  PMH/SH reviewed - no change compared to H&P    ________________________________________________________________________        Comments   >50% of visit spent in counseling and coordination of care     ________________________________________________________________________  Dennis Doan MD     Procedures: see electronic medical records for all procedures/Xrays and details which were not copied into this note but were reviewed prior to creation of Plan. LABS:  I reviewed today's most current labs and imaging studies.   Pertinent labs include:  Recent Labs     11/29/23  1404 11/30/23  0223 12/01/23  0126   WBC 8.6 7.2 5.9   HGB 13.9 13.0 12.8   HCT 41.9 39.5 39.4    334 326       Recent Labs     11/29/23  1404 11/30/23  0223 12/01/23  0126    140 141   K 4.0 3.4* 3.8    111* 113*   CO2 27 24 25   GLUCOSE 99 88 92   BUN 15 10 16   CREATININE 0.81 0.67 0.67   CALCIUM 9.5 8.8 9.0   LABALBU 4.2  --  3.5   BILITOT 0.4  --  0.4   AST 14*  --  13*   ALT 21  --  19   INR 1.1  --   --          Signed: Dennis Doan MD

## 2023-12-01 NOTE — DISCHARGE INSTRUCTIONS
Take the aspirin until the blood work sent lookong for clotting abnormalities comes back, discuss with Dr Marcela Lovelace and your PCP at follow up

## 2023-12-01 NOTE — PROGRESS NOTES
TRANSFER - OUT REPORT:    Verbal report given to [facility] (name) on [facility] being transferred to Neuro(unit) for routine progression of patient care       Report consisted of patient's Situation, Background, Assessment and   Recommendations(SBAR). Information from the following report(s) Recent Results was reviewed with the receiving nurse. Opportunity for questions and clarification was provided.       Patient transported with:   [employer]

## 2023-12-01 NOTE — CONSULTS
Consult    NAME: Reid Miranda   :  2000   MRN:  [id number]     Date/Time:  2023 11:02 AM    Patient PCP: REYMUNDO Aguila NP  ________________________________________________________________________     Assessment:     Presumed CVA s/p TNK  Migraines  Hypothyroid  Anemia        Plan:   Echo w/ nml LVEF and no obvious PFO  MRI neg  CTA neg    For DAYANARA today as recommended by neurology. All r/b/a explained. Wishes to proceed. Thank you for this consult and allowing me to take part in this patients care. Please call with questions. [x]        High complexity decision making was performed        Subjective:   CHIEF COMPLAINT: CP    HISTORY OF PRESENT ILLNESS:     [facility] is a 21 y.o.  female who \"presenting with acute onset chest pain followed by shortness of breath, word finding difficulties and paresthesias in the left leg and left arm, received TNK for concern for stroke. Symptoms resolved after TNK. MRI of the brain was done which showed no evidence of acute infarct. Patient reports that she was in her usual state of health. She has been having chest pain last few days feeling that someone is sitting on her chest.  It was difficult to take deep breaths. This became concerning to her and she became anxious and went to the ER to have this checked out and while in the ER she began developing difficulty getting her words out as well as tingling that started in her left leg and slowly went to her left arm. After the tingling occurred, she started to feel that it was completely numb and she could not feel anything. During all this time she had been having worsening of her migraine headaches as she is on her period. She has had chronic migraine headaches without ever having any strokelike symptoms with them. She has been following with her PCP who has prescribed Nurtec as an abortive. She does feel like this has been helping.      She denies any history of Range    Body Surface Area 1.65 m2    LVOT Diameter 2.0 cm    LVOT Peak Gradient 4 mmHg    LVOT Peak Gradient 4 mmHg    LVOT Mean Gradient 2 mmHg    LVOT SV 63.7 ml    LVOT Peak Velocity 1.0 m/s    LVOT Peak Velocity 1.0 m/s    LVOT VTI 20.3 cm    RVIDd 3.1 cm    RV Free Wall Peak S' 14 cm/s    LA Volume A-L A4C 35 22 - 52 mL    LA Volume MOD A4C 31 22 - 52 mL    AV Cusp Mmode 2.0 cm    AV Area by Peak Velocity 2.8 cm2    AV Area by Peak Velocity 2.8 cm2    AV Area by Peak Velocity 2.8 cm2    AV Area by Peak Velocity 2.7 cm2    AV Area by VTI 2.6 cm2    AV Peak Gradient 5 mmHg    AV Peak Gradient 5 mmHg    AV Mean Gradient 3 mmHg    AV Peak Velocity 1.1 m/s    AV Peak Velocity 1.1 m/s    AV Mean Velocity 0.8 m/s    AV VTI 23.5 cm    MV A Velocity 0.54 m/s    MV E Wave Deceleration Time 153.9 ms    MV E Velocity 0.78 m/s    LV E' Lateral Velocity 19 cm/s    LV E' Septal Velocity 10 cm/s    MV Area by VTI 2.4 cm2    MV Peak Gradient 4 mmHg    MV Mean Gradient 1 mmHg    MV Max Velocity 1.0 m/s    MV Mean Velocity 0.5 m/s    MV VTI 26.8 cm    Pulmonary Artery EDP 2 mmHg    WA Max Velocity 0.7 m/s    PV Peak Gradient 4 mmHg    PV Max Velocity 1.0 m/s    Aortic Root 2.2 cm    MV E/A 1.44     E/E' Ratio (Averaged) 5.95     E/E' Lateral 4.11     LVOT Stroke Volume Index 40.3 mL/m2    LVOT Area 3.1 cm2    LA Volume Index A-L A4C 22 16 - 34 mL/m2    LA Volume Index MOD A4C 20 16 - 34 ml/m2    Ao Root Index 1.39 cm/m2    LVOT:AV VTI Index 0.86     JUNE/BSA VTI 1.6 cm2/m2    MV:LVOT VTI Index 1.32    Intraoperative Anesthesia DAYANARA    Collection Time: 11/30/23  3:49 PM   Result Value Ref Range    Body Surface Area 1.65 m2   Urine Drug Screen    Collection Time: 11/30/23  4:27 PM   Result Value Ref Range    Amphetamine, Urine Negative NEG      Barbiturates, Urine Negative NEG      Benzodiazepines, Urine Negative NEG      Cocaine, Urine Negative NEG      Methadone, Urine Negative NEG      Opiates, Urine Positive (A) NEG      PCP, Urine Negative NEG      THC, TH-Cannabinol, Urine Negative NEG      Comments: (NOTE)    CBC with Auto Differential    Collection Time: 12/01/23  1:26 AM   Result Value Ref Range    WBC 5.9 3.6 - 11.0 K/uL    RBC 4.52 3.80 - 5.20 M/uL    Hemoglobin 12.8 11.5 - 16.0 g/dL    Hematocrit 39.4 35.0 - 47.0 %    MCV 87.2 80.0 - 99.0 FL    MCH 28.3 26.0 - 34.0 PG    MCHC 32.5 30.0 - 36.5 g/dL    RDW 13.6 11.5 - 14.5 %    Platelets 726 042 - 830 K/uL    MPV 10.1 8.9 - 12.9 FL    Nucleated RBCs 0.0 0  WBC    nRBC 0.00 0.00 - 0.01 K/uL    Neutrophils % 52 32 - 75 %    Lymphocytes % 35 12 - 49 %    Monocytes % 6 5 - 13 %    Eosinophils % 5 0 - 7 %    Basophils % 1 0 - 1 %    Immature Granulocytes 1 (H) 0.0 - 0.5 %    Neutrophils Absolute 3.1 1.8 - 8.0 K/UL    Lymphocytes Absolute 2.1 0.8 - 3.5 K/UL    Monocytes Absolute 0.4 0.0 - 1.0 K/UL    Eosinophils Absolute 0.3 0.0 - 0.4 K/UL    Basophils Absolute 0.0 0.0 - 0.1 K/UL    Absolute Immature Granulocyte 0.0 0.00 - 0.04 K/UL    Differential Type AUTOMATED     Comprehensive Metabolic Panel    Collection Time: 12/01/23  1:26 AM   Result Value Ref Range    Sodium 141 136 - 145 mmol/L    Potassium 3.8 3.5 - 5.1 mmol/L    Chloride 113 (H) 97 - 108 mmol/L    CO2 25 21 - 32 mmol/L    Anion Gap 3 (L) 5 - 15 mmol/L    Glucose 92 65 - 100 mg/dL    BUN 16 6 - 20 MG/DL    Creatinine 0.67 0.55 - 1.02 MG/DL    Bun/Cre Ratio 24 (H) 12 - 20      Est, Glom Filt Rate >60 >60 ml/min/1.73m2    Calcium 9.0 8.5 - 10.1 MG/DL    Total Bilirubin 0.4 0.2 - 1.0 MG/DL    ALT 19 12 - 78 U/L    AST 13 (L) 15 - 37 U/L    Alk Phosphatase 48 45 - 117 U/L    Total Protein 6.6 6.4 - 8.2 g/dL    Albumin 3.5 3.5 - 5.0 g/dL    Globulin 3.1 2.0 - 4.0 g/dL    Albumin/Globulin Ratio 1.1 1.1 - 2.2          Ha Hughes DO

## 2023-12-01 NOTE — CARE COORDINATION
1340 - Pt planning d/c. No CM needs identified. CM will continue to follow and complete full eval if able, otherwise clear for dc. SMAARTER tool completed and at doorway.      Jose Manuel Ludwig MSW  Care Management

## 2023-12-01 NOTE — PROGRESS NOTES
End of Shift Note    Bedside shift change report given to  (oncoming nurse) by Clement Trent RN (offgoing nurse). Report included the following information Nurse Handoff Report      Shift worked:  Night   Shift summary and any significant changes:       Patient was running sinus gomez over the night. Her blood pressure was as low as 100/54mmhg. She has been NPO since midnight . Scheduled for DAYANARA today. For cardiologist consult today. Concerns for physician to address:  Low blood pressure   Zone phone for oncoming shift:   [phone]     Patient Information  Jorge Hilton  23 y.o.  11/29/2023  1:44 PM by Tamela Davis MD. Jorge Hilton was admitted from Home    Problem List  Patient Active Problem List    Diagnosis Date Noted    Ischemic stroke ([address]) 11/29/2023    Nontoxic multinodular goiter 02/14/2023    Migraine without aura and without status migrainosus, not intractable 02/14/2023    Hypothyroidism 10/03/2022     Past Medical History:   Diagnosis Date    Anemia     Hypothyroidism 10/3/2022    Ischemic stroke ([address]) 11/29/2023    Migraine without aura and without status migrainosus, not intractable 2/14/2023    Nontoxic multinodular goiter 2/14/2023    Thyroid disease        Core Measures:  CVA: Yes Yes  CHF:Yes Yes  PNA:YesYes    Activity:  Activity: In bed  Number times ambulated in hallways past shift: 0  Number of times OOB to chair past shift: 0    Cardiac:   Cardiac Monitoring: Yes      Cardiac Rhythm: Sinus rhythm    Access:   Current line(s): PIV  Central Line? No Placement date  Reason Medically Necessary   PICC LINE? No Placement date Reason Medically Necessary     Genitourinary:   Urinary status: voiding   Urinary Catheter?  No Placement Date  Reason Medically Necessary     Respiratory:   O2 Device: None (Room air)  Chronic home O2 use?: no  Incentive spirometer at bedside: no       GI:  Last BM (including prior to admit): 11/29/23  Current diet:  Diet NPO  Passing flatus: yes  Tolerating

## 2023-12-02 LAB
AT III AG PPP IA-ACNC: 90 % (ref 72–124)
AT III PPP CHRO-ACNC: 109 % (ref 75–135)
PROT C PPP-ACNC: 97 % (ref 73–180)
PROT S ACT/NOR PPP: 67 % (ref 63–140)

## 2023-12-04 ENCOUNTER — TELEPHONE (OUTPATIENT)
Age: 23
End: 2023-12-04

## 2023-12-04 NOTE — TELEPHONE ENCOUNTER
----- Message from HOSP MUMTAZ Wu sent at 12/4/2023  9:31 AM EST -----  Subject: Hospital Follow Up    QUESTIONS  What hospital was the Patient Discharged from? [address] [address]   Date of Discharge? 2023-12-01  Discharge Location? Home  Reason for hospitalization as patient stated? Stroke  What question does the patient have, if applicable?   ---------------------------------------------------------------------------  --------------  CALL BACK INFO  What is the best way for the office to contact you? OK to leave message on   voicemail  Preferred Call Back Phone Number? [phone]  ---------------------------------------------------------------------------  --------------  SCRIPT ANSWERS  Relationship to Patient?  Self

## 2023-12-06 LAB
F5 P.R506Q BLD/T QL: NORMAL
IMP & REVIEW OF LAB RESULTS: NORMAL

## 2023-12-11 ENCOUNTER — PATIENT MESSAGE (OUTPATIENT)
Age: 23
End: 2023-12-11

## 2023-12-11 ENCOUNTER — TELEMEDICINE (OUTPATIENT)
Age: 23
End: 2023-12-11

## 2023-12-11 DIAGNOSIS — Z09 HOSPITAL DISCHARGE FOLLOW-UP: Primary | ICD-10-CM

## 2023-12-11 DIAGNOSIS — I63.9 CEREBROVASCULAR ACCIDENT (CVA), UNSPECIFIED MECHANISM (HCC): ICD-10-CM

## 2023-12-11 DIAGNOSIS — R07.9 CHEST PAIN, UNSPECIFIED TYPE: ICD-10-CM

## 2023-12-11 DIAGNOSIS — R00.2 PALPITATIONS: ICD-10-CM

## 2023-12-11 DIAGNOSIS — G43.009 MIGRAINE WITHOUT AURA AND WITHOUT STATUS MIGRAINOSUS, NOT INTRACTABLE: ICD-10-CM

## 2023-12-11 LAB
APTT HEX PL PPP: 5 SEC
APTT IMM NP PPP: NORMAL SEC
APTT PPP 1:1 SALINE: NORMAL SEC
APTT PPP: 28 SEC
B2 GLYCOPROT1 IGA SER-ACNC: <10 SAU
B2 GLYCOPROT1 IGG SER-ACNC: <10 SGU
B2 GLYCOPROT1 IGM SER-ACNC: <10 SMU
CARDIOLIPIN IGA SER IA-ACNC: <10 APL
CARDIOLIPIN IGG SER IA-ACNC: <10 GPL
CARDIOLIPIN IGM SER IA-ACNC: 10 MPL
CONFIRM APTT: 1 SEC
CONFIRM DRVVT: NORMAL SEC
LABORATORY COMMENT REPORT: NORMAL
PROTHROM IGG SERPL-ACNC: 1 G UNITS
PS IGG SER IA-ACNC: 0 GPS
PS IGM SER IA-ACNC: 2 MPS
SCREEN DRVVT/NORMAL: NORMAL RATIO
SCREEN DRVVT: 35.7 SEC

## 2023-12-11 RX ORDER — RIMEGEPANT SULFATE 75 MG/75MG
1 TABLET, ORALLY DISINTEGRATING ORAL EVERY OTHER DAY
Qty: 16 TABLET | Refills: 1 | Status: SHIPPED | OUTPATIENT
Start: 2023-12-11

## 2023-12-11 SDOH — ECONOMIC STABILITY: FOOD INSECURITY: WITHIN THE PAST 12 MONTHS, YOU WORRIED THAT YOUR FOOD WOULD RUN OUT BEFORE YOU GOT MONEY TO BUY MORE.: NEVER TRUE

## 2023-12-11 SDOH — ECONOMIC STABILITY: INCOME INSECURITY: HOW HARD IS IT FOR YOU TO PAY FOR THE VERY BASICS LIKE FOOD, HOUSING, MEDICAL CARE, AND HEATING?: NOT VERY HARD

## 2023-12-11 SDOH — ECONOMIC STABILITY: FOOD INSECURITY: WITHIN THE PAST 12 MONTHS, THE FOOD YOU BOUGHT JUST DIDN'T LAST AND YOU DIDN'T HAVE MONEY TO GET MORE.: NEVER TRUE

## 2023-12-11 ASSESSMENT — PATIENT HEALTH QUESTIONNAIRE - PHQ9
SUM OF ALL RESPONSES TO PHQ QUESTIONS 1-9: 0
SUM OF ALL RESPONSES TO PHQ QUESTIONS 1-9: 0
SUM OF ALL RESPONSES TO PHQ9 QUESTIONS 1 & 2: 0
SUM OF ALL RESPONSES TO PHQ QUESTIONS 1-9: 0
2. FEELING DOWN, DEPRESSED OR HOPELESS: 0
1. LITTLE INTEREST OR PLEASURE IN DOING THINGS: 0
SUM OF ALL RESPONSES TO PHQ QUESTIONS 1-9: 0

## 2023-12-11 NOTE — PROGRESS NOTES
Post-Discharge Transitional Care  Follow Up      Vel Giang   YOB: 2000    Date of Office Visit:  12/11/2023  Date of Hospital Admission: 11/29/23  Date of Hospital Discharge: 12/1/23  Risk of hospital readmission (high >=14%. Medium >=10%) :Readmission Risk Score: 5.1      Care management risk score Rising risk (score 2-5) and Complex Care (Scores >=6): No Risk Score On File     Non face to face  following discharge, date last encounter closed (first attempt may have been earlier): *No documented post hospital discharge outreach found in the last 14 days    Call initiated 2 business days of discharge: *No response recorded in the last 14 days    ASSESSMENT/PLAN:   Below is the assessment and plan developed based on review of pertinent history, physical exam, labs, studies, and medications. Hospital discharge follow-up  -     DC DISCHARGE MEDS RECONCILED W/ CURRENT OUTPATIENT MED LIST  Cerebrovascular accident (CVA), unspecified mechanism ([address]) VS   Migraine without aura and without status migrainosus, not intractable - patient has appt with neuro 1/10/24. Hypercoagulable labs negative, can stop ASA. Needs to schedule to see cardiology for 30d event recorder  -     External Referral To Cardiology  -     Rimegepant Sulfate (NURTEC) 75 MG TBDP; Take 1 tablet by mouth every other day, Disp-16 tablet, R-1Normal  Palpitations  -     External Referral To Cardiology  Chest pain, unspecified type  -     External Referral To Cardiology    On this date 12/11/2023 I have spent 35 minutes reviewing previous notes, test results and face to face with the patient discussing the diagnosis and importance of compliance with the treatment plan as well as documenting on the day of the visit. Subjective:   HPI:  Follow up of Hospital problems/diagnosis(es): CVA vs complex migraine/hemiplegic migraine    Inpatient course: Discharge summary reviewed- see chart.     Had acute onset chest pain followed by

## 2023-12-11 NOTE — PROGRESS NOTES
Chief Complaint   Patient presents with    Follow-Up from Hospital    Cerebrovascular Accident       1. \"Have you been to the ER, urgent care clinic since your last visit? Hospitalized since your last visit? \" Yes [facility]. Stroke    2. \"Have you seen or consulted any other health care providers outside of the [address] [place] [address] since your last visit? \" No       3. For patients aged 21-65: Has the patient had a pap smear? yes     The patient, Brii Carrillo, identity was verified by name and .   Health Maintenance Due   Topic Date Due    Varicella vaccine (1 of 2 - 2-dose childhood series) Never done    HPV vaccine (1 - 2-dose series) Never done    Chlamydia/GC screen  2020    Pap smear  Never done

## 2023-12-13 ENCOUNTER — CLINICAL DOCUMENTATION (OUTPATIENT)
Age: 23
End: 2023-12-13

## 2023-12-20 NOTE — PROGRESS NOTES
Physician Progress Note      Gisele Villegas  [facility] #:                  [id number]  :                       2000  ADMIT DATE:       2023 1:44 PM  [address] [facility] DATE:        2023 2:43 PM  RESPONDING  PROVIDER #:        Myra Bobby MD          QUERY TEXT:    Patient admitted with Slurred speech, loss of sensation on the left side of   the body. Noted documentation of Ischemic stroke in H&P. In order to support   the diagnosis of Ischemic stroke, please include additional clinical   indicators in your documentation. Or please document if the diagnosis of   Ischemic stroke has been ruled out after further study. The medical record reflects the following:  Risk Factors: H/O Ischemic stroke 2023    Clinical Indicators:  H&P:  Assessment :  The patient is a 51-year-old female who we are seeing in consultation at the   request of Dr. Luis Huang for admission to ICU post TNK. Ischemic stroke    CTA head and neck read by radiology FINDINGS:  Delayed contrast-enhanced head CT:  The ventricles are midline without hydrocephalus. ? There is no acute intra or  extra-axial hemorrhage. The basal cisterns are clear. ?The paranasal sinuses   are  clear. ?  CTA HEAD:  No evidence for intracranial aneurysm or hemodynamically significant stenosis. MRI brain read by radiology FINDINGS:  IMPRESSION:  1. Normal brain MRI. DC Summary:  Discharge Diagnoses:  ? Suspected complex migraine > ischemic stroke 2023 POA      Treatment: Neurology consult, Monitoring  Options provided:  -- CVA present as evidenced by##, Please document evidence. -- CVA was ruled out  -- Other - I will add my own diagnosis  -- Disagree - Not applicable / Not valid  -- Disagree - Clinically unable to determine / Unknown  -- Refer to Clinical Documentation Reviewer    PROVIDER RESPONSE TEXT:    CVA was ruled out after study.     Query created by: Mel Landa on 2023 1:57 PM      Electronically signed by:  Marcus Reyez

## 2024-01-10 ENCOUNTER — OFFICE VISIT (OUTPATIENT)
Age: 24
End: 2024-01-10
Payer: COMMERCIAL

## 2024-01-10 VITALS
OXYGEN SATURATION: 99 % | SYSTOLIC BLOOD PRESSURE: 100 MMHG | DIASTOLIC BLOOD PRESSURE: 70 MMHG | WEIGHT: 127 LBS | HEART RATE: 84 BPM | RESPIRATION RATE: 18 BRPM | HEIGHT: 63 IN | BODY MASS INDEX: 22.5 KG/M2 | TEMPERATURE: 99.1 F

## 2024-01-10 DIAGNOSIS — R07.9 CHEST PAIN IN ADULT: ICD-10-CM

## 2024-01-10 DIAGNOSIS — G43.109 COMPLICATED MIGRAINE: ICD-10-CM

## 2024-01-10 DIAGNOSIS — G43.009 MIGRAINE WITHOUT AURA AND WITHOUT STATUS MIGRAINOSUS, NOT INTRACTABLE: Primary | ICD-10-CM

## 2024-01-10 PROCEDURE — 99214 OFFICE O/P EST MOD 30 MIN: CPT | Performed by: NURSE PRACTITIONER

## 2024-01-10 RX ORDER — GALCANEZUMAB 120 MG/ML
120 INJECTION, SOLUTION SUBCUTANEOUS
Qty: 1 ADJUSTABLE DOSE PRE-FILLED PEN SYRINGE | Refills: 5 | Status: SHIPPED | OUTPATIENT
Start: 2024-01-10

## 2024-01-10 ASSESSMENT — PATIENT HEALTH QUESTIONNAIRE - PHQ9
SUM OF ALL RESPONSES TO PHQ QUESTIONS 1-9: 0
2. FEELING DOWN, DEPRESSED OR HOPELESS: 0
SUM OF ALL RESPONSES TO PHQ QUESTIONS 1-9: 0
SUM OF ALL RESPONSES TO PHQ9 QUESTIONS 1 & 2: 0
1. LITTLE INTEREST OR PLEASURE IN DOING THINGS: 0

## 2024-01-10 ASSESSMENT — ENCOUNTER SYMPTOMS
VOMITING: 0
NAUSEA: 0

## 2024-01-10 NOTE — PROGRESS NOTES
Chief Complaint   Patient presents with    Follow-Up from [facility] from 11/29-12/1/23- did have trouble with words for a couple weeks after - now everything is back to normal      1. Have you been to the ER, urgent care clinic since your last visit?  Hospitalized since your last visit? No     2. Have you seen or consulted any other health care providers outside of the [facility] since your last visit?  Include any pap smears or colon screening.  No     
a sample has been provided to the patient today in the office, she is to continue with the monthly injection.  Safety and side effects medication were discussed.  Advised patient may utilize the Nurtec as an abortive if she continues to have migraine headaches.  Healthy lifestyle encouraged.  Encouraged good sleep-wake cycle, improvements to her diet continue with regular exercise and lessening stressors.  She is contact me if any worsening signs or symptoms.  2.  Complicated migraine: All diagnostics to include the brain MRI, head neck CTA, all labs as well as all cardiac testing, from her most recent hospitalization reviewed with patient, patient should avoid triptans.  We will initiate patient on Emgality, she is to contact us for any worsening signs or symptoms.  3.  Chest pain: Patient is completing the cardiac event monitor, she has a follow-up appoint with cardiology to review those results and help establish a plan of care.  Tobacco use: Smoking cessation discussed, stroke risk factors reviewed with patient advised smoking cessation will drastically improve her health.      Patient and/or family verbalized understand of all instructions and all questions/concerns were addressed.  Safety/side effects of medications discussed.    Patient remains a complex patient secondary to polypharmacy, significant comorbid conditions, and use of high-risk medications which complicate the decision making process related to patient's neurologic diagnosis.    The patient is to follow up in 3 months, sooner if needed.    DIANA Boudreaux

## 2024-01-16 ENCOUNTER — TELEPHONE (OUTPATIENT)
Age: 24
End: 2024-01-16

## 2024-01-16 NOTE — TELEPHONE ENCOUNTER
RE:Emgality prior authorization sent to [employer] through [employer]    Status is now approved     (Key: [id number])    PA Case: [id number]    Coverage Starts on: 1/16/2024      Coverage Ends on: 4/15/2024     FYI to nurse

## 2024-01-16 NOTE — TELEPHONE ENCOUNTER
Homero Madsen,    Patient sent I message through my chart :       I need prior authorization on the emgality. Just trying to get sooner than later so that i can hopefully get it in time for next month thank you.   I do see something was initiated but was closed??

## 2024-01-23 DIAGNOSIS — G43.009 MIGRAINE WITHOUT AURA AND WITHOUT STATUS MIGRAINOSUS, NOT INTRACTABLE: ICD-10-CM

## 2024-01-23 RX ORDER — RIMEGEPANT SULFATE 75 MG/75MG
1 TABLET, ORALLY DISINTEGRATING ORAL EVERY OTHER DAY
Qty: 16 TABLET | Refills: 1 | Status: SHIPPED | OUTPATIENT
Start: 2024-01-23

## 2024-01-25 NOTE — TELEPHONE ENCOUNTER
PA Case: [id number], Status: Approved, Coverage Starts on: 1/23/2024 12:00:00 AM, Coverage Ends on: 1/22/2025 12:00:00 AM.